# Patient Record
Sex: FEMALE | Race: WHITE | Employment: OTHER | URBAN - METROPOLITAN AREA
[De-identification: names, ages, dates, MRNs, and addresses within clinical notes are randomized per-mention and may not be internally consistent; named-entity substitution may affect disease eponyms.]

---

## 2018-05-01 PROBLEM — R73.03 PREDIABETES: Chronic | Status: ACTIVE | Noted: 2018-05-01

## 2018-05-01 PROBLEM — E03.9 ACQUIRED HYPOTHYROIDISM: Chronic | Status: ACTIVE | Noted: 2018-05-01

## 2018-05-01 PROBLEM — E78.00 HYPERCHOLESTEROLEMIA: Chronic | Status: ACTIVE | Noted: 2018-05-01

## 2018-05-01 PROBLEM — I10 HYPERTENSION: Chronic | Status: ACTIVE | Noted: 2018-05-01

## 2020-01-21 PROBLEM — Z85.71 HISTORY OF HODGKIN'S LYMPHOMA: Status: ACTIVE | Noted: 2020-01-21

## 2020-01-24 PROBLEM — M19.019 PRIMARY OSTEOARTHRITIS OF SHOULDER: Status: ACTIVE | Noted: 2020-01-24

## 2020-01-30 PROBLEM — Z85.72 HISTORY OF NON-HODGKIN'S LYMPHOMA: Status: ACTIVE | Noted: 2020-01-30

## 2020-01-31 PROBLEM — K21.9 GASTROESOPHAGEAL REFLUX DISEASE WITHOUT ESOPHAGITIS: Status: ACTIVE | Noted: 2020-01-31

## 2020-01-31 PROBLEM — D50.8 IRON DEFICIENCY ANEMIA SECONDARY TO INADEQUATE DIETARY IRON INTAKE: Status: ACTIVE | Noted: 2020-01-31

## 2020-01-31 PROBLEM — F51.01 PRIMARY INSOMNIA: Status: ACTIVE | Noted: 2020-01-31

## 2020-01-31 PROBLEM — M47.819 FACET ARTHROPATHY: Status: ACTIVE | Noted: 2020-01-31

## 2020-01-31 PROBLEM — Z87.19 S/P DILATATION OF ESOPHAGEAL STRICTURE: Status: ACTIVE | Noted: 2020-01-31

## 2020-01-31 PROBLEM — Z86.73 HISTORY OF TIA (TRANSIENT ISCHEMIC ATTACK): Status: ACTIVE | Noted: 2020-01-31

## 2020-01-31 PROBLEM — I48.91 ATRIAL FIBRILLATION (HCC): Status: ACTIVE | Noted: 2020-01-31

## 2020-01-31 PROBLEM — J61 ASBESTOSIS (HCC): Status: ACTIVE | Noted: 2020-01-31

## 2020-01-31 PROBLEM — I50.32 CHRONIC DIASTOLIC (CONGESTIVE) HEART FAILURE (HCC): Status: ACTIVE | Noted: 2020-01-31

## 2020-01-31 PROBLEM — Q28.3 CEREBRAL CAVERNOMA: Status: ACTIVE | Noted: 2020-01-31

## 2020-01-31 PROBLEM — K22.2 ESOPHAGEAL STENOSIS: Status: ACTIVE | Noted: 2020-01-31

## 2020-01-31 PROBLEM — M43.10 ANTEROLISTHESIS: Status: ACTIVE | Noted: 2020-01-31

## 2020-01-31 PROBLEM — Z95.2 HX OF AORTIC VALVE REPLACEMENT: Status: ACTIVE | Noted: 2020-01-31

## 2020-01-31 PROBLEM — R60.0 BILATERAL LOWER EXTREMITY EDEMA: Status: ACTIVE | Noted: 2020-01-31

## 2020-01-31 PROBLEM — Z79.01 CHRONIC ANTICOAGULATION: Status: ACTIVE | Noted: 2020-01-31

## 2020-01-31 PROBLEM — Z98.890 S/P DILATATION OF ESOPHAGEAL STRICTURE: Status: ACTIVE | Noted: 2020-01-31

## 2020-02-05 PROBLEM — Z95.3 HISTORY OF AORTIC VALVE REPLACEMENT WITH BIOPROSTHETIC VALVE: Status: ACTIVE | Noted: 2020-01-31

## 2020-02-05 PROBLEM — I49.5 SINUS NODE DYSFUNCTION (HCC): Status: ACTIVE | Noted: 2020-02-05

## 2020-02-10 ENCOUNTER — HOSPITAL ENCOUNTER (OUTPATIENT)
Dept: CT IMAGING | Age: 85
Discharge: HOME | End: 2020-02-10
Attending: NURSE PRACTITIONER
Payer: MEDICARE

## 2020-02-10 DIAGNOSIS — Z85.72 HISTORY OF NON-HODGKIN'S LYMPHOMA: ICD-10-CM

## 2020-02-10 DIAGNOSIS — R53.83 FATIGUE, UNSPECIFIED TYPE: ICD-10-CM

## 2020-02-10 DIAGNOSIS — R06.02 SHORTNESS OF BREATH: ICD-10-CM

## 2020-02-10 PROCEDURE — 71250 CT THORAX DX C-: CPT

## 2020-02-12 NOTE — PROGRESS NOTES
Reviewed results with patient/daughter. Will check labs tomorrow and address as needed. Answered all questions.

## 2020-02-27 PROBLEM — I06.0 RHEUMATIC AORTIC STENOSIS: Status: ACTIVE | Noted: 2020-02-27

## 2020-02-28 PROBLEM — T82.857A STENOSIS OF PROSTHETIC AORTIC VALVE: Status: ACTIVE | Noted: 2020-02-28

## 2020-03-02 ENCOUNTER — TELEPHONE (OUTPATIENT)
Dept: CASE MANAGEMENT | Age: 85
End: 2020-03-02

## 2020-03-02 DIAGNOSIS — I35.0 AORTIC VALVE STENOSIS, ETIOLOGY OF CARDIAC VALVE DISEASE UNSPECIFIED: Primary | ICD-10-CM

## 2020-03-02 NOTE — TELEPHONE ENCOUNTER
Spoke to daughter, Rose Marie Marquez, regarding pt TAVR CT on 3/4 @4522 with arrival @0573. No food after 0545 and nothing to drink except sips of water with meds after 0745. Instructed to hold demadex 3/4 am. Pt has contrast allergy causes itching, CT allergy protocol called to [facility] per pt choice. Prednisone 50 mg 13 hr prior to scan  Prednisone 50 mg 7 hr prior to scan  Prednisone 50 mg and benadryl 50 mg 1 hr prior to CT scan. Contact info provided for any questions.     Jere Ferraro, TAVR Coordinator

## 2020-03-04 ENCOUNTER — HOSPITAL ENCOUNTER (OUTPATIENT)
Dept: ULTRASOUND IMAGING | Age: 85
Discharge: HOME | End: 2020-03-04
Attending: INTERNAL MEDICINE
Payer: MEDICARE

## 2020-03-04 ENCOUNTER — HOSPITAL ENCOUNTER (OUTPATIENT)
Dept: CT IMAGING | Age: 85
Discharge: HOME | End: 2020-03-04
Attending: INTERNAL MEDICINE
Payer: MEDICARE

## 2020-03-04 VITALS — HEIGHT: 64 IN | BODY MASS INDEX: 23.9 KG/M2 | WEIGHT: 140 LBS

## 2020-03-04 DIAGNOSIS — I35.0 AORTIC VALVE STENOSIS, ETIOLOGY OF CARDIAC VALVE DISEASE UNSPECIFIED: ICD-10-CM

## 2020-03-04 PROCEDURE — 93880 EXTRACRANIAL BILAT STUDY: CPT

## 2020-03-04 PROCEDURE — 74011636320 HC RX REV CODE- 636/320: Performed by: INTERNAL MEDICINE

## 2020-03-04 PROCEDURE — 75574 CT ANGIO HRT W/3D IMAGE: CPT

## 2020-03-04 PROCEDURE — 74011000258 HC RX REV CODE- 258: Performed by: INTERNAL MEDICINE

## 2020-03-04 PROCEDURE — 71275 CT ANGIOGRAPHY CHEST: CPT

## 2020-03-04 RX ORDER — SODIUM CHLORIDE 0.9 % (FLUSH) 0.9 %
10 SYRINGE (ML) INJECTION
Status: COMPLETED | OUTPATIENT
Start: 2020-03-04 | End: 2020-03-04

## 2020-03-04 RX ADMIN — Medication 10 ML: at 09:44

## 2020-03-04 RX ADMIN — IOPAMIDOL 75 ML: 755 INJECTION, SOLUTION INTRAVENOUS at 09:44

## 2020-03-04 RX ADMIN — SODIUM CHLORIDE 100 ML: 900 INJECTION, SOLUTION INTRAVENOUS at 09:44

## 2020-03-04 NOTE — DISCHARGE INSTRUCTIONS
Patient Education        Learning About IV Extravasation  What is IV extravasation? Medicine and fluids are often given directly into a blood vessel through an IV (intravenous) tube, or catheter. Extravasation (say \"hk-jkbk-swq-SAY-edson\") is leakage of fluid in the tissues around the IV site. It happens when the catheter has come out of the blood vessel but is still in the nearby tissue. It may also happen if the blood vessel leaks because it is weak or damaged. The fluids collect in the tissues around the IV site rather than staying in the blood vessel. The buildup of fluid can cause tissue damage at the site. The leakage also prevents the medicine or fluid from being sent into the bloodstream for treatment as intended. What are the symptoms? Symptoms of IV extravasation include:  · Swelling around the IV site. · Pain. The amount of pain may depend on what type of medicine or fluid has leaked into the tissue. · Redness of the skin at the site. How is it treated? To treat IV extravasation, the nurse will remove the IV and clean the site. If needed, another IV will be inserted at a new site elsewhere on the body. The IV site will be raised above the level of the body, if it's on the arm or leg. This keeps the fluid from pooling in one place and helps prevent tissue damage. The IV site will be watched for signs of tissue damage or infection. With treatment, the swelling should go down day by day. When should you call for help? Call your doctor now or seek immediate medical care if:  · You have symptoms of infection, such as:  ? Increased pain, swelling, warmth, or redness. ? Red streaks leading from the IV site. ? Pus draining from the IV site. ? A fever. · You have new or worse pain. · The swelling around your IV site is getting worse. Watch closely for changes in your health, and be sure to contact your doctor if:  · You do not get better as expected.   Follow-up care is a key part of your treatment and safety. Be sure to make and go to all appointments, and call your doctor if you are having problems. It's also a good idea to know your test results and keep a list of the medicines you take. Where can you learn more? Go to http://lavinia-mo.info/. Enter M121 in the search box to learn more about \"Learning About IV Extravasation. \"  Current as of: December 13, 2018  Content Version: 12.2  © 6218-4351 [employer]. Care instructions adapted under license by [employer] (which disclaims liability or warranty for this information). If you have questions about a medical condition or this instruction, always ask your healthcare professional. [address] any warranty or liability for your use of this information.

## 2020-03-13 ENCOUNTER — TELEPHONE (OUTPATIENT)
Dept: CASE MANAGEMENT | Age: 85
End: 2020-03-13

## 2020-03-13 NOTE — TELEPHONE ENCOUNTER
Patient instructions given for [facility] poss with Dr Phuong Palafox. Scheduled for 3/18 at 3:30pm, arrival time 2 hr prior(1:30pm). Patient instructed to bring all home medications in labeled bottles on the day of procedure. NPO after midnight 3/18 except for sips of water with medications. Patient informed to take 81 mg x 4 on the day of procedure. Hold Demadex on am of the procedure  Hold eliquis 24-48 hr prior to the procedure- last dose on 3/16 at night. Pt has an allergy to contrast dye so therefore prednisone and benadryl have been called to the pharmacy of her choice per IV contrast protocol:  Prednisone 50 mg 13hr(0230), 7hr(0830), and 1hr(1430)  prior to procedure and benadryl 50 mg 1hr prior to procedure. Instructed they can take all other medications excluding vitamins & supplements. Patient verbalizes understanding of all instructions & denies any questions at this time. Informed that Aria Caal from cath lab will contact him with final details prior to [facility]. Contact info provided. Also instructed daughter and pt that due to precautions for COVID-19 the hospitals has implemented plans for only one visitor per pt and all patients and visitors are to come thru the outpatient entrance.      MANISH Lyman Coordinator

## 2020-03-17 NOTE — PROGRESS NOTES
Patient pre-assessment complete for [facility] poss with Dr Maura Agee scheduled for 3/18/20 at 3:30pm, arrival time 1:30pm. Patient verified using . Patient instructed to bring all home medications in labeled bottles on the day of procedure. NPO status reinforced. Patient informed to take a full dose aspirin 325mg  or 81 mg x 4 on the day of procedure. Patient instructed to HOLD eliquis (last dose 3/16/20) & HOLD torsemide in am . Instructed they can take all other medications excluding vitamins & supplements. Patient verbalizes understanding of all instructions & denies any questions at this time.

## 2020-03-18 ENCOUNTER — HOSPITAL ENCOUNTER (OUTPATIENT)
Dept: CARDIAC CATH/INVASIVE PROCEDURES | Age: 85
Discharge: HOME | End: 2020-03-18
Attending: INTERNAL MEDICINE | Admitting: INTERNAL MEDICINE
Payer: MEDICARE

## 2020-03-18 VITALS
BODY MASS INDEX: 23.39 KG/M2 | HEIGHT: 64 IN | HEART RATE: 64 BPM | WEIGHT: 137 LBS | DIASTOLIC BLOOD PRESSURE: 57 MMHG | SYSTOLIC BLOOD PRESSURE: 118 MMHG | OXYGEN SATURATION: 91 % | RESPIRATION RATE: 14 BRPM

## 2020-03-18 LAB
ANION GAP SERPL CALC-SCNC: 7 MMOL/L (ref 7–16)
ATRIAL RATE: 70 BPM
BASOPHILS # BLD: 0 K/UL (ref 0–0.2)
BASOPHILS NFR BLD: 0 % (ref 0–2)
BUN SERPL-MCNC: 25 MG/DL (ref 8–23)
CALCIUM SERPL-MCNC: 9.3 MG/DL (ref 8.3–10.4)
CALCULATED R AXIS, ECG10: -63 DEGREES
CALCULATED T AXIS, ECG11: 77 DEGREES
CHLORIDE SERPL-SCNC: 106 MMOL/L (ref 98–107)
CO2 SERPL-SCNC: 27 MMOL/L (ref 21–32)
CREAT SERPL-MCNC: 0.95 MG/DL (ref 0.6–1)
DIAGNOSIS, 93000: NORMAL
DIFFERENTIAL METHOD BLD: ABNORMAL
EOSINOPHIL # BLD: 0.2 K/UL (ref 0–0.8)
EOSINOPHIL NFR BLD: 3 % (ref 0.5–7.8)
ERYTHROCYTE [DISTWIDTH] IN BLOOD BY AUTOMATED COUNT: 14.5 % (ref 11.9–14.6)
GLUCOSE SERPL-MCNC: 175 MG/DL (ref 65–100)
HCT VFR BLD AUTO: 38.7 % (ref 35.8–46.3)
HGB BLD-MCNC: 12.5 G/DL (ref 11.7–15.4)
IMM GRANULOCYTES # BLD AUTO: 0 K/UL (ref 0–0.5)
IMM GRANULOCYTES NFR BLD AUTO: 1 % (ref 0–5)
INR PPP: 1.1
LYMPHOCYTES # BLD: 0.9 K/UL (ref 0.5–4.6)
LYMPHOCYTES NFR BLD: 14 % (ref 13–44)
MCH RBC QN AUTO: 35 PG (ref 26.1–32.9)
MCHC RBC AUTO-ENTMCNC: 32.3 G/DL (ref 31.4–35)
MCV RBC AUTO: 108.4 FL (ref 79.6–97.8)
MONOCYTES # BLD: 0.1 K/UL (ref 0.1–1.3)
MONOCYTES NFR BLD: 1 % (ref 4–12)
NEUTS SEG # BLD: 4.8 K/UL (ref 1.7–8.2)
NEUTS SEG NFR BLD: 80 % (ref 43–78)
NRBC # BLD: 0 K/UL (ref 0–0.2)
PLATELET # BLD AUTO: 169 K/UL (ref 150–450)
PMV BLD AUTO: 10.4 FL (ref 9.4–12.3)
POTASSIUM SERPL-SCNC: 4 MMOL/L (ref 3.5–5.1)
PROTHROMBIN TIME: 14.3 SEC (ref 12–14.7)
Q-T INTERVAL, ECG07: 480 MS
QRS DURATION, ECG06: 144 MS
QTC CALCULATION (BEZET), ECG08: 507 MS
RBC # BLD AUTO: 3.57 M/UL (ref 4.05–5.2)
SODIUM SERPL-SCNC: 140 MMOL/L (ref 136–145)
VENTRICULAR RATE, ECG03: 67 BPM
WBC # BLD AUTO: 5.9 K/UL (ref 4.3–11.1)

## 2020-03-18 PROCEDURE — C1769 GUIDE WIRE: HCPCS

## 2020-03-18 PROCEDURE — 74011000250 HC RX REV CODE- 250: Performed by: INTERNAL MEDICINE

## 2020-03-18 PROCEDURE — C1894 INTRO/SHEATH, NON-LASER: HCPCS

## 2020-03-18 PROCEDURE — 77030015766

## 2020-03-18 PROCEDURE — 99152 MOD SED SAME PHYS/QHP 5/>YRS: CPT

## 2020-03-18 PROCEDURE — 77030029997 HC DEV COM RDL R BND TELE -B

## 2020-03-18 PROCEDURE — 80048 BASIC METABOLIC PNL TOTAL CA: CPT

## 2020-03-18 PROCEDURE — 74011250636 HC RX REV CODE- 250/636: Performed by: INTERNAL MEDICINE

## 2020-03-18 PROCEDURE — 77030016699 HC CATH ANGI DX INFN1 CARD -A

## 2020-03-18 PROCEDURE — 74011250637 HC RX REV CODE- 250/637: Performed by: INTERNAL MEDICINE

## 2020-03-18 PROCEDURE — 74011636320 HC RX REV CODE- 636/320: Performed by: INTERNAL MEDICINE

## 2020-03-18 PROCEDURE — 85025 COMPLETE CBC W/AUTO DIFF WBC: CPT

## 2020-03-18 PROCEDURE — 93005 ELECTROCARDIOGRAM TRACING: CPT | Performed by: INTERNAL MEDICINE

## 2020-03-18 PROCEDURE — 93454 CORONARY ARTERY ANGIO S&I: CPT

## 2020-03-18 PROCEDURE — 85610 PROTHROMBIN TIME: CPT

## 2020-03-18 RX ORDER — LIDOCAINE HYDROCHLORIDE 10 MG/ML
2-20 INJECTION, SOLUTION EPIDURAL; INFILTRATION; INTRACAUDAL; PERINEURAL ONCE
Status: COMPLETED | OUTPATIENT
Start: 2020-03-18 | End: 2020-03-18

## 2020-03-18 RX ORDER — ONDANSETRON 2 MG/ML
4 INJECTION INTRAMUSCULAR; INTRAVENOUS
Status: CANCELLED | OUTPATIENT
Start: 2020-03-18 | End: 2020-03-19

## 2020-03-18 RX ORDER — SODIUM CHLORIDE 0.9 % (FLUSH) 0.9 %
5-40 SYRINGE (ML) INJECTION AS NEEDED
Status: CANCELLED | OUTPATIENT
Start: 2020-03-18

## 2020-03-18 RX ORDER — HEPARIN SODIUM 200 [USP'U]/100ML
3 INJECTION, SOLUTION INTRAVENOUS CONTINUOUS
Status: DISCONTINUED | OUTPATIENT
Start: 2020-03-18 | End: 2020-03-18 | Stop reason: HOSPADM

## 2020-03-18 RX ORDER — SODIUM CHLORIDE 0.9 % (FLUSH) 0.9 %
5-40 SYRINGE (ML) INJECTION EVERY 8 HOURS
Status: CANCELLED | OUTPATIENT
Start: 2020-03-18

## 2020-03-18 RX ORDER — SODIUM CHLORIDE 9 MG/ML
75 INJECTION, SOLUTION INTRAVENOUS CONTINUOUS
Status: CANCELLED | OUTPATIENT
Start: 2020-03-18

## 2020-03-18 RX ORDER — FENTANYL CITRATE 50 UG/ML
25-50 INJECTION, SOLUTION INTRAMUSCULAR; INTRAVENOUS
Status: DISCONTINUED | OUTPATIENT
Start: 2020-03-18 | End: 2020-03-18 | Stop reason: HOSPADM

## 2020-03-18 RX ORDER — HYDROCODONE BITARTRATE AND ACETAMINOPHEN 5; 325 MG/1; MG/1
1 TABLET ORAL
Status: CANCELLED | OUTPATIENT
Start: 2020-03-18

## 2020-03-18 RX ORDER — MIDAZOLAM HYDROCHLORIDE 1 MG/ML
.5-2 INJECTION, SOLUTION INTRAMUSCULAR; INTRAVENOUS
Status: DISCONTINUED | OUTPATIENT
Start: 2020-03-18 | End: 2020-03-18 | Stop reason: HOSPADM

## 2020-03-18 RX ORDER — GUAIFENESIN 100 MG/5ML
81-324 LIQUID (ML) ORAL ONCE
Status: COMPLETED | OUTPATIENT
Start: 2020-03-18 | End: 2020-03-18

## 2020-03-18 RX ORDER — SODIUM CHLORIDE 9 MG/ML
75 INJECTION, SOLUTION INTRAVENOUS CONTINUOUS
Status: DISCONTINUED | OUTPATIENT
Start: 2020-03-18 | End: 2020-03-18 | Stop reason: HOSPADM

## 2020-03-18 RX ORDER — ACETAMINOPHEN 325 MG/1
650 TABLET ORAL
Status: CANCELLED | OUTPATIENT
Start: 2020-03-18

## 2020-03-18 RX ADMIN — IOPAMIDOL 50 ML: 755 INJECTION, SOLUTION INTRAVENOUS at 15:01

## 2020-03-18 RX ADMIN — ASPIRIN 81 MG 324 MG: 81 TABLET ORAL at 13:50

## 2020-03-18 RX ADMIN — HEPARIN SODIUM 3 ML/HR: 5000 INJECTION, SOLUTION INTRAVENOUS; SUBCUTANEOUS at 14:52

## 2020-03-18 RX ADMIN — MIDAZOLAM 1 MG: 1 INJECTION INTRAMUSCULAR; INTRAVENOUS at 14:50

## 2020-03-18 RX ADMIN — LIDOCAINE HYDROCHLORIDE 3 ML: 10 INJECTION, SOLUTION EPIDURAL; INFILTRATION; INTRACAUDAL; PERINEURAL at 14:52

## 2020-03-18 RX ADMIN — SODIUM CHLORIDE 75 ML/HR: 900 INJECTION, SOLUTION INTRAVENOUS at 13:50

## 2020-03-18 RX ADMIN — HEPARIN SODIUM 2 ML: 10000 INJECTION, SOLUTION INTRAVENOUS; SUBCUTANEOUS at 14:52

## 2020-03-18 RX ADMIN — FENTANYL CITRATE 25 MCG: 50 INJECTION, SOLUTION INTRAMUSCULAR; INTRAVENOUS at 14:50

## 2020-03-18 NOTE — PROCEDURES
[facility]  CARDIAC CATH    Name:  Kendra Salinas  MR#:  [id number]  :  1927  ACCOUNT #:  [de-identified]  DATE OF SERVICE:  2020    PROCEDURES PERFORMED:  Coronary angiography. PREOPERATIVE DIAGNOSIS:  Severe aortic stenosis. Preoperative cardiac catheterization requested. 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 SURGEON:  Digna Rust. MD Baldev.    SURGICAL ASSISTANT:  None. ESTIMATED BLOOD LOSS:  Less than 5 mL. SPECIMENS REMOVED:  None. COMPLICATIONS:  None. IMPLANTS:  None. ANESTHESIA:  The patient received moderate supervised conscious sedation with 1 mg Versed, 25 mcg of fentanyl. Sedation start time was 02:50 p.m. with a procedure completion time of 03:04 p.m. Sedation was administered by Kimberly Nix RN under my supervision. FINDINGS:  As below. TECHNICAL FACTORS:  After informed consent was obtained, the patient was brought to the cardiac catheterization lab. The right radial artery was prepped, draped in the usual sterile fashion. Utilizing modified Seldinger technique, the right radial artery was entered. I had difficulty advancing the guidewire. Ultimately using a Choice PT wire, I was able to enter the brachial artery. At this point, 6-Faroese arterial sheath was placed without difficulty. A radial cocktail consisting of 2000 units of heparin, 2 mg verapamil, and 200 mcg of nitroglycerin was administered. A 5-Faroese Tiger 4.0 catheter was used to select and engage the ostium of left main coronary artery. A JR-4 catheter was used to select and engage the ostium of right coronary artery. Selective injection and verification was performed. A pigtail catheter was used to cross the aortic valve and the left ventricle. Hemodynamic measurements and left ventriculogram were obtained. Left ventricular aortic pressure gradient was obtained by pullback technique.     At the conclusion of the diagnostic procedure, the radial sheath was removed and a pneumatic band was placed with good hemostasis. No complications were encountered. CONTRAST ISOVUE:  50. HEMODYNAMIC RESULTS:  Aortic pressure 127/55. ANGIOGRAPHIC RESULTS:  1. Left main coronary artery:  Large-caliber vessel. 20% distal stenosis. 2.  LAD:  Medium-caliber vessel. 20% proximal and 40% mid stenosis. 3.  First diagonal artery:  Small-caliber vessel. Patent. 4.  Left circumflex:  Medium-caliber vessel. 20-30% mid stenosis. 5.  First obtuse marginal.  Medium-caliber vessel. 30% ostial stenosis. 6.  Second obtuse marginal artery:  Small-caliber vessel. Patent. 7.  Right coronary artery:  Medium caliber vessel. 20% proximal, 20% mid and 20% distal stenosis. 8.  Right PDA:  Small-caliber vessel. Patent. 9.  Right posterolateral branch:  Medium-caliber vessel. Patent. CONCLUSION:  Mild nonobstructive coronary artery disease. PLAN:  Transcatheter aortic valve replacement.       Javier Noel MD      [id number]  D:  03/18/2020 15:22  T:  03/18/2020 16:30  JOB #:  [id number]  CC:  Keyla Santizo MD

## 2020-03-18 NOTE — PROGRESS NOTES
TRANSFER - OUT REPORT:    Verbal report given to Jaya Huerta RN(name) on [address]  being transferred to CPRU(unit) for routine progression of care       Report consisted of patients Situation, Background, Assessment and   Recommendations(SBAR). Information from the following report(s) SBAR was reviewed with the receiving nurse.     [facility] w/ Dr. Nanette Fuchs  No interventions  R radial  TR band 15 mls  1 mg versed  25 mcg fentanyl

## 2020-03-18 NOTE — PROGRESS NOTES
Patient received to [address] [place] [address] room # 14  Ambulatory from [facility]. Patient scheduled for lhc/poss today with Dr Tona Edwards. Procedure reviewed & questions answered, voiced good understanding consent obtained & placed on chart. All medications and medical history reviewed. Will prep patient per orders. Patient & family updated on plan of care.       The patient has a fraility score of 3-MANAGING WELL, based on independent of adl's

## 2020-03-18 NOTE — PROGRESS NOTES
Report received from [address] [place] [address]. Procedural findings communicated. Intra procedural  medication administration reviewed. Progression of care discussed.      Patient received into [address] [facility] 6 post sheath removal.     Access site without bleeding or swelling yes    Dressing dry and intact yes    Patient instructed to limit movement to right upper extremity    Routine post procedural vital signs and site assessment initiated yes

## 2020-03-18 NOTE — DISCHARGE INSTRUCTIONS
HEART CATHETERIZATION/ANGIOGRAPHY DISCHARGE INSTRUCTIONS    1. Check puncture site frequently for swelling or bleeding. If there is any bleeding, lie down and apply pressure over the area with a clean towel or washcloth and call 911. Notify your doctor for any redness, swelling, drainage, or oozing from the puncture site. Notify your doctor for any fever or chills. 2. If the extremity becomes cold, numb, or painful call Dr. Britt Carlin at [phone].  3. Activity should be limited for the next 48 hours. Avoid pushing, pulling and bending of affected wrist for 48 hours. No heavy lifting (anything over 5 pounds) for 3 days. No driving for 48 hours. 4. You may resume your usual diet. Drink more fluids than usual.  5. Have a responsible person drive you home and stay with you for at least 24 hours after your heart catheterization/angiography. 6. You may remove bandage from your right arm  in 24 hours. You may shower in 24 hours. No tub baths, hot tubs, or swimming for 1 week. Do not place any lotions, creams, powders, or ointments over puncture site for 1 week. You may place a clean band-aid over the puncture site each day for 5 days. Change daily. I have read the above instructions and have had the opportunity to ask questions.

## 2020-03-18 NOTE — PROCEDURES
Brief Cardiac Procedure Note    Patient: Herman Martinez MRN: [id number]  SSN: [id number]    YOB: 1927  Age: 80 y.o. Sex: female      Date of Procedure: 3/18/2020     Pre-procedure Diagnosis: Aortic Stenosis    Post-procedure Diagnosis: Mild non-obstructive CAD    Procedure: Left Heart Catheterization    Brief Description of Procedure: As above    Performed By: Elva Reich MD     Assistants: None    Anesthesia: Moderate Sedation    Estimated Blood Loss: Less than 10 mL      Specimens: None    Implants: None    Findings: mild CAD. Complications: None    Recommendations: Continue medical therapy.     Signed By: Elva Reich MD     March 18, 2020

## 2020-03-18 NOTE — PROGRESS NOTES
Do you currently have any signs or symptoms of respiratory infection, such as fever, cough, shortness of breath, or sore throat? No    In the last 14 days have you had contact with someone with confirmed or presumptive diagnosis of COVID-19 or someone under investigation of COVID-19? No    In the last 14 days have you traveled or have someone in your home who has traveled to [place], [facility], [facility], [place], [place], [place], [place], or [place]?  No

## 2020-03-19 ENCOUNTER — ANESTHESIA EVENT (OUTPATIENT)
Dept: SURGERY | Age: 85
DRG: 267 | End: 2020-03-19
Payer: MEDICARE

## 2020-03-23 ENCOUNTER — HOSPITAL ENCOUNTER (OUTPATIENT)
Dept: GENERAL RADIOLOGY | Age: 85
DRG: 267 | End: 2020-03-23
Attending: PHYSICIAN ASSISTANT
Payer: MEDICARE

## 2020-03-23 ENCOUNTER — HOSPITAL ENCOUNTER (OUTPATIENT)
Dept: SURGERY | Age: 85
Discharge: HOME | DRG: 267 | End: 2020-03-23
Payer: MEDICARE

## 2020-03-23 ENCOUNTER — HOSPITAL ENCOUNTER (OUTPATIENT)
Dept: GENERAL RADIOLOGY | Age: 85
Discharge: HOME | DRG: 267 | End: 2020-03-23
Attending: PHYSICIAN ASSISTANT
Payer: MEDICARE

## 2020-03-23 ENCOUNTER — HOSPITAL ENCOUNTER (INPATIENT)
Age: 85
LOS: 2 days | Discharge: HOME | DRG: 267 | End: 2020-03-25
Attending: INTERNAL MEDICINE | Admitting: INTERNAL MEDICINE
Payer: MEDICARE

## 2020-03-23 VITALS
DIASTOLIC BLOOD PRESSURE: 88 MMHG | SYSTOLIC BLOOD PRESSURE: 196 MMHG | HEART RATE: 73 BPM | OXYGEN SATURATION: 95 % | RESPIRATION RATE: 16 BRPM | TEMPERATURE: 97.5 F | HEIGHT: 62 IN | BODY MASS INDEX: 25.47 KG/M2

## 2020-03-23 PROBLEM — N39.0 UTI (URINARY TRACT INFECTION): Status: ACTIVE | Noted: 2020-03-23

## 2020-03-23 LAB
ALBUMIN SERPL-MCNC: 3.8 G/DL (ref 3.2–4.6)
ALBUMIN/GLOB SERPL: 1.5 {RATIO} (ref 1.2–3.5)
ALP SERPL-CCNC: 60 U/L (ref 50–136)
ALT SERPL-CCNC: 22 U/L (ref 12–65)
ANION GAP SERPL CALC-SCNC: 6 MMOL/L (ref 7–16)
APPEARANCE UR: ABNORMAL
AST SERPL-CCNC: 19 U/L (ref 15–37)
ATRIAL RATE: 59 BPM
BACTERIA SPEC CULT: NORMAL
BACTERIA URNS QL MICRO: ABNORMAL /HPF
BILIRUB SERPL-MCNC: 2.1 MG/DL (ref 0.2–1.1)
BILIRUB UR QL: NEGATIVE
BNP SERPL-MCNC: 4973 PG/ML
BUN SERPL-MCNC: 16 MG/DL (ref 8–23)
CALCIUM SERPL-MCNC: 9 MG/DL (ref 8.3–10.4)
CALCULATED R AXIS, ECG10: -65 DEGREES
CALCULATED T AXIS, ECG11: 43 DEGREES
CASTS URNS QL MICRO: ABNORMAL /LPF
CHLORIDE SERPL-SCNC: 107 MMOL/L (ref 98–107)
CO2 SERPL-SCNC: 30 MMOL/L (ref 21–32)
COLOR UR: YELLOW
CREAT SERPL-MCNC: 0.8 MG/DL (ref 0.6–1)
DIAGNOSIS, 93000: NORMAL
EPI CELLS #/AREA URNS HPF: ABNORMAL /HPF
ERYTHROCYTE [DISTWIDTH] IN BLOOD BY AUTOMATED COUNT: 14.9 % (ref 11.9–14.6)
EST. AVERAGE GLUCOSE BLD GHB EST-MCNC: 120 MG/DL
GLOBULIN SER CALC-MCNC: 2.6 G/DL (ref 2.3–3.5)
GLUCOSE SERPL-MCNC: 103 MG/DL (ref 65–100)
GLUCOSE UR STRIP.AUTO-MCNC: NEGATIVE MG/DL
HBA1C MFR BLD: 5.8 % (ref 4.8–6)
HCT VFR BLD AUTO: 37.9 % (ref 35.8–46.3)
HGB BLD-MCNC: 12.5 G/DL (ref 11.7–15.4)
HGB UR QL STRIP: ABNORMAL
INR PPP: 1.2
KETONES UR QL STRIP.AUTO: NEGATIVE MG/DL
LEUKOCYTE ESTERASE UR QL STRIP.AUTO: ABNORMAL
MAGNESIUM SERPL-MCNC: 2.1 MG/DL (ref 1.8–2.4)
MCH RBC QN AUTO: 35.2 PG (ref 26.1–32.9)
MCHC RBC AUTO-ENTMCNC: 33 G/DL (ref 31.4–35)
MCV RBC AUTO: 106.8 FL (ref 79.6–97.8)
NITRITE UR QL STRIP.AUTO: POSITIVE
NRBC # BLD: 0 K/UL (ref 0–0.2)
PH UR STRIP: 7.5 [PH] (ref 5–9)
PLATELET # BLD AUTO: 178 K/UL (ref 150–450)
PMV BLD AUTO: 10.7 FL (ref 9.4–12.3)
POTASSIUM SERPL-SCNC: 3.6 MMOL/L (ref 3.5–5.1)
PROT SERPL-MCNC: 6.4 G/DL (ref 6.3–8.2)
PROT UR STRIP-MCNC: 30 MG/DL
PROTHROMBIN TIME: 15.7 SEC (ref 12–14.7)
Q-T INTERVAL, ECG07: 448 MS
QRS DURATION, ECG06: 142 MS
QTC CALCULATION (BEZET), ECG08: 454 MS
RBC # BLD AUTO: 3.55 M/UL (ref 4.05–5.2)
RBC #/AREA URNS HPF: ABNORMAL /HPF
SERVICE CMNT-IMP: NORMAL
SODIUM SERPL-SCNC: 143 MMOL/L (ref 136–145)
SP GR UR REFRACTOMETRY: 1.02 (ref 1–1.02)
UROBILINOGEN UR QL STRIP.AUTO: 4 EU/DL (ref 0.2–1)
VENTRICULAR RATE, ECG03: 62 BPM
WBC # BLD AUTO: 8.3 K/UL (ref 4.3–11.1)
WBC URNS QL MICRO: ABNORMAL /HPF

## 2020-03-23 PROCEDURE — 77030027138 HC INCENT SPIROMETER -A

## 2020-03-23 PROCEDURE — 87641 MR-STAPH DNA AMP PROBE: CPT

## 2020-03-23 PROCEDURE — 86900 BLOOD TYPING SEROLOGIC ABO: CPT

## 2020-03-23 PROCEDURE — 87186 SC STD MICRODIL/AGAR DIL: CPT

## 2020-03-23 PROCEDURE — 94010 BREATHING CAPACITY TEST: CPT

## 2020-03-23 PROCEDURE — 87088 URINE BACTERIA CULTURE: CPT

## 2020-03-23 PROCEDURE — 65270000029 HC RM PRIVATE

## 2020-03-23 PROCEDURE — 87086 URINE CULTURE/COLONY COUNT: CPT

## 2020-03-23 PROCEDURE — 83036 HEMOGLOBIN GLYCOSYLATED A1C: CPT

## 2020-03-23 PROCEDURE — 81001 URINALYSIS AUTO W/SCOPE: CPT

## 2020-03-23 PROCEDURE — 71046 X-RAY EXAM CHEST 2 VIEWS: CPT

## 2020-03-23 PROCEDURE — 83735 ASSAY OF MAGNESIUM: CPT

## 2020-03-23 PROCEDURE — 85027 COMPLETE CBC AUTOMATED: CPT

## 2020-03-23 PROCEDURE — 85610 PROTHROMBIN TIME: CPT

## 2020-03-23 PROCEDURE — 93005 ELECTROCARDIOGRAM TRACING: CPT | Performed by: PHYSICIAN ASSISTANT

## 2020-03-23 PROCEDURE — 86923 COMPATIBILITY TEST ELECTRIC: CPT

## 2020-03-23 PROCEDURE — 83880 ASSAY OF NATRIURETIC PEPTIDE: CPT

## 2020-03-23 PROCEDURE — 80053 COMPREHEN METABOLIC PANEL: CPT

## 2020-03-23 RX ORDER — DIPHENHYDRAMINE HYDROCHLORIDE 25 MG/1
25 CAPSULE ORAL SEE ADMIN INSTRUCTIONS
COMMUNITY
Start: 2020-03-19 | End: 2020-04-02

## 2020-03-23 RX ORDER — PREDNISONE 50 MG/1
50 TABLET ORAL SEE ADMIN INSTRUCTIONS
COMMUNITY
Start: 2020-03-19 | End: 2020-04-02

## 2020-03-23 NOTE — PERIOP NOTES
Patient confirms name and . Order to obtain consent not currently found in EHR, however patient agrees with case posting. Komal Mena RN TAVR navigator notified. Luis Garcia also notified that the patient mistakenly took dose of Eliquis 3/22/20 at 0830. Pt realized the mistake and has since removed Eliquis from medication box with the help of her daughter Daniela Keita. Per Luis Garcia, pt may proceed with surgery as planned. Pt verbalizes understanding of 1 total caregiver/ / visitor policy. Labs/Orders per Surgeon: completed  POC Glucose:not indocated    Dr Sarah Arellano in to assess patient per anesthesia protocol. All cardiac records reviewed. Medication list visualized today. Patient viewed preoperative heart teaching video. Pre op instructions and education sheets given and reviewed with patient:  Heart instructions, central line catheter infection prevention, ventilator education,  blood transfusion education, hand hygiene education, smoking cessation education, pain management education. Patient verbalizes understanding of all instructions. Patient given incentive spirometer with verbal instructions,demonstrates proficient use, and verbalizes understanding to bring incentive spirometer on day of surgery. Patient seen by respiratory therapist, and  FEV1 results on chart. Pt instructed on importance of handwashing for infection prevention. Pt verbalizes understanding to shower nightly with antibacterial soap & Hibiclens provided at pre assessment on the night prior to and morning of surgery. Instructed to turn water off and wash with HIBICLENS from chin to toes avoiding genitalia, then rinse after 1 minute. Pt verbalizes understanding to repeat this the morning of surgery. MRSA swab and clean catch urine obtained and sent to lab. Patient ID armband placed on patient's arm, and patient given copy of order for CXR.   Patient verbalizes understanding to proceed to radiology after labs are drawn to have both CXR and carotid ultrasound completed before leaving the hospital today. Labs drawn by Enrique Hubbard [facility] including  blood bank labs. [place] blood bank wrist band placed on the patient's right wrist and pt verbalizes understanding not to remove the band until discharged from the hospital after surgery. Patient verbalizes understanding that arrival time will be called to patient on the weekday prior to surgery date and that patient must check phone messages. If patient has any questions regarding arrival times call [phone]. PT. INSTRUCTED TO CALL THE FOLLOWING NUMBERS IF ANY SAFETY CONCERNS BEFORE, DURING, OR AFTER HOSPITAL ENCOUNTER: PT. SAFETY LINE - [phone] OR PT. RELATIONS - [phone].

## 2020-03-23 NOTE — ANESTHESIA PREPROCEDURE EVALUATION
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 2018)     Cardiovascular    Hypertension: well controlled  Valvular problems/murmurs: aortic stenosis      Dysrhythmias : atrial fibrillation  Hyperlipidemia    Exercise tolerance: <4 METS  Comments: Cath - nonobstructive dz  Echo - critical AS of prosthetic AV (mean grad 83, peak grad 140)  Severe TR, pulm HTN   GI/Hepatic/Renal  Within defined limits              Endo/Other      Hypothyroidism: well controlled  Arthritis     Other Findings              Physical Exam    Airway  Mallampati: II  TM Distance: 4 - 6 cm  Neck ROM: normal range of motion   Mouth opening: Normal     Cardiovascular    Rhythm: regular  Rate: normal    Murmur: Grade 4, Aortic area     Dental    Dentition: Full lower dentures and Full upper dentures     Pulmonary  Breath sounds clear to auscultation               Abdominal         Other Findings            Anesthetic Plan    ASA: 4  Anesthesia type: total IV anesthesia and general - backup    Monitoring Plan: Arterial line        Anesthetic plan and risks discussed with: Patient and Son / Daughter

## 2020-03-23 NOTE — PERIOP NOTES
PLEASE CONTINUE TAKING ALL PRESCRIPTION MEDICATIONS UP TO THE DAY OF SURGERY UNLESS OTHERWISE DIRECTED BELOW. DISCONTINUE all vitamins and supplements 7 days prior to surgery. DISCONTINUE Non-Steriodal Anti-Inflammatory (NSAIDS) such as Advil and Aleve 5 days prior to surgery. Home Medications to take  the day of surgery   Banophen (benadryl) and Prednisone as scheduled   Acetaminophen 1000 mg (Tylenol)    Carvedilol / Coreg  Aspirin 81 mg   levothyroxen /Synthroid     Home Medications   to Hold   Eliquis - (last dose was 3/22/20 @ 830 am)        Comments    On the day before surgery take Acetaminophen 1000mg in the morning and at bedtime      Bring to hospital: [employer] / (benadryl & prednisone)     *Visitor policy of 1 visitor per patient discussed. Please do not bring home medications with you on the day of surgery unless otherwise directed by your nurse. If you are instructed to bring home medications, please give them to your nurse as they will be administered by the nursing staff. If you have any questions, please call [facility] [phone] or [facility] [phone]. A copy of this note was provided to the patient for reference.

## 2020-03-23 NOTE — PERIOP NOTES
Recent Results (from the past 12 hour(s))   TYPE + CROSSMATCH    Collection Time: 03/23/20  8:39 AM   Result Value Ref Range    Crossmatch Expiration 03/26/2020     ABO/Rh(D) A POSITIVE     Antibody screen NEG    CBC W/O DIFF    Collection Time: 03/23/20  8:41 AM   Result Value Ref Range    WBC 8.3 4.3 - 11.1 K/uL    RBC 3.55 (L) 4.05 - 5.2 M/uL    HGB 12.5 11.7 - 15.4 g/dL    HCT 37.9 35.8 - 46.3 %    .8 (H) 79.6 - 97.8 FL    MCH 35.2 (H) 26.1 - 32.9 PG    MCHC 33.0 31.4 - 35.0 g/dL    RDW 14.9 (H) 11.9 - 14.6 %    PLATELET 807 425 - 304 K/uL    MPV 10.7 9.4 - 12.3 FL    ABSOLUTE NRBC 0.00 0.0 - 0.2 K/uL   METABOLIC PANEL, COMPREHENSIVE    Collection Time: 03/23/20  8:41 AM   Result Value Ref Range    Sodium 143 136 - 145 mmol/L    Potassium 3.6 3.5 - 5.1 mmol/L    Chloride 107 98 - 107 mmol/L    CO2 30 21 - 32 mmol/L    Anion gap 6 (L) 7 - 16 mmol/L    Glucose 103 (H) 65 - 100 mg/dL    BUN 16 8 - 23 MG/DL    Creatinine 0.80 0.6 - 1.0 MG/DL    GFR est AA >60 >60 ml/min/1.73m2    GFR est non-AA >60 >60 ml/min/1.73m2    Calcium 9.0 8.3 - 10.4 MG/DL    Bilirubin, total 2.1 (H) 0.2 - 1.1 MG/DL    ALT (SGPT) 22 12 - 65 U/L    AST (SGOT) 19 15 - 37 U/L    Alk. phosphatase 60 50 - 136 U/L    Protein, total 6.4 6.3 - 8.2 g/dL    Albumin 3.8 3.2 - 4.6 g/dL    Globulin 2.6 2.3 - 3.5 g/dL    A-G Ratio 1.5 1.2 - 3.5     PROTHROMBIN TIME + INR    Collection Time: 03/23/20  8:41 AM   Result Value Ref Range    Prothrombin time 15.7 (H) 12.0 - 14.7 sec    INR 1.2     HEMOGLOBIN A1C WITH EAG    Collection Time: 03/23/20  8:41 AM   Result Value Ref Range    Hemoglobin A1c 5.8 4.8 - 6.0 %    Est. average glucose 120 mg/dL   MAGNESIUM    Collection Time: 03/23/20  8:41 AM   Result Value Ref Range    Magnesium 2.1 1.8 - 2.4 mg/dL   MSSA/MRSA SC BY PCR, NASAL SWAB    Collection Time: 03/23/20  8:41 AM   Result Value Ref Range    Special Requests: NO SPECIAL REQUESTS      Culture result:        SA target not detected. A MRSA NEGATIVE, SA NEGATIVE test result does not preclude MRSA or SA nasal colonization. NT-PRO BNP    Collection Time: 03/23/20  8:41 AM   Result Value Ref Range    NT pro-BNP 4,973 (H) <450 PG/ML   URINALYSIS W/ RFLX MICROSCOPIC    Collection Time: 03/23/20  8:47 AM   Result Value Ref Range    Color YELLOW      Appearance CLOUDY      Specific gravity 1.017 1.001 - 1.023      pH (UA) 7.5 5.0 - 9.0      Protein 30 (A) NEG mg/dL    Glucose NEGATIVE  mg/dL    Ketone NEGATIVE  NEG mg/dL    Bilirubin NEGATIVE  NEG      Blood TRACE (A) NEG      Urobilinogen 4.0 (H) 0.2 - 1.0 EU/dL    Nitrites POSITIVE (A) NEG      Leukocyte Esterase MODERATE (A) NEG      WBC  0 /hpf    RBC 10-20 0 /hpf    Epithelial cells 5-10 0 /hpf    Bacteria 4+ (H) 0 /hpf    Casts 3-5 0 /lpf   EKG, 12 LEAD, INITIAL    Collection Time: 03/23/20  8:56 AM   Result Value Ref Range    Ventricular Rate 62 BPM    Atrial Rate 59 BPM    QRS Duration 142 ms    Q-T Interval 448 ms    QTC Calculation (Bezet) 454 ms    Calculated R Axis -65 degrees    Calculated T Axis 43 degrees    Diagnosis       Atrial fibrillation  Right bundle branch block  Left anterior fascicular block  !!! Bifascicular block !!! Moderate voltage criteria for LVH, may be normal variant  Abnormal ECG  When compared with ECG of 18-MAR-2020 13:55,  No significant change was found  Confirmed by [facility]  MD ()ANY ([id number]) on 3/23/2020 3:45:26 PM        Confirmed with Michael Perera RN TAVR Navigator that she and Grundy Warba have reviewed all labs and are aware of abnormal UA and BNP.

## 2020-03-24 ENCOUNTER — ANESTHESIA (OUTPATIENT)
Dept: SURGERY | Age: 85
DRG: 267 | End: 2020-03-24
Payer: MEDICARE

## 2020-03-24 PROBLEM — I35.0 AORTIC VALVE STENOSIS: Status: ACTIVE | Noted: 2020-03-24

## 2020-03-24 LAB
ATRIAL RATE: 59 BPM
BASE EXCESS BLD CALC-SCNC: 1 MMOL/L
BASE EXCESS BLD CALC-SCNC: 1 MMOL/L
CA-I BLD-MCNC: 1.19 MMOL/L (ref 1.12–1.32)
CA-I BLD-MCNC: 1.2 MMOL/L (ref 1.12–1.32)
CALCULATED R AXIS, ECG10: 59 DEGREES
CALCULATED T AXIS, ECG11: -75 DEGREES
DIAGNOSIS, 93000: NORMAL
GLUCOSE BLD STRIP.AUTO-MCNC: 176 MG/DL (ref 65–100)
GLUCOSE BLD STRIP.AUTO-MCNC: 185 MG/DL (ref 65–100)
HCO3 BLD-SCNC: 26.1 MMOL/L (ref 22–26)
HCO3 BLD-SCNC: 26.7 MMOL/L (ref 22–26)
PCO2 BLD: 42.6 MMHG (ref 35–45)
PCO2 BLD: 46.9 MMHG (ref 35–45)
PH BLD: 7.36 [PH] (ref 7.35–7.45)
PH BLD: 7.4 [PH] (ref 7.35–7.45)
PO2 BLD: 196 MMHG (ref 75–100)
PO2 BLD: 213 MMHG (ref 75–100)
POTASSIUM BLD-SCNC: 3.7 MMOL/L (ref 3.5–5.1)
POTASSIUM BLD-SCNC: 3.8 MMOL/L (ref 3.5–5.1)
Q-T INTERVAL, ECG07: 518 MS
QRS DURATION, ECG06: 136 MS
QTC CALCULATION (BEZET), ECG08: 486 MS
SAO2 % BLD: 100 % (ref 95–98)
SAO2 % BLD: 100 % (ref 95–98)
SODIUM BLD-SCNC: 141 MMOL/L (ref 136–145)
SODIUM BLD-SCNC: 142 MMOL/L (ref 136–145)
VENTRICULAR RATE, ECG03: 53 BPM

## 2020-03-24 PROCEDURE — 65660000000 HC RM CCU STEPDOWN

## 2020-03-24 PROCEDURE — 74011250636 HC RX REV CODE- 250/636: Performed by: NURSE ANESTHETIST, CERTIFIED REGISTERED

## 2020-03-24 PROCEDURE — 77030013438 HC CBL PCMKR REMG -B: Performed by: INTERNAL MEDICINE

## 2020-03-24 PROCEDURE — 77030020407 HC IV BLD WRMR ST 3M -A: Performed by: NURSE ANESTHETIST, CERTIFIED REGISTERED

## 2020-03-24 PROCEDURE — 77030002996 HC SUT SLK J&J -A: Performed by: INTERNAL MEDICINE

## 2020-03-24 PROCEDURE — C1894 INTRO/SHEATH, NON-LASER: HCPCS

## 2020-03-24 PROCEDURE — C1760 CLOSURE DEV, VASC: HCPCS

## 2020-03-24 PROCEDURE — 77030005318 HC CATH ELECTRD PACE TMP BARD -C

## 2020-03-24 PROCEDURE — 74011000250 HC RX REV CODE- 250: Performed by: NURSE ANESTHETIST, CERTIFIED REGISTERED

## 2020-03-24 PROCEDURE — 74011250637 HC RX REV CODE- 250/637: Performed by: INTERNAL MEDICINE

## 2020-03-24 PROCEDURE — C1769 GUIDE WIRE: HCPCS

## 2020-03-24 PROCEDURE — 77030016699 HC CATH ANGI DX INFN1 CARD -A

## 2020-03-24 PROCEDURE — 77030037468 HC VLV AORT EDWRD -L: Performed by: INTERNAL MEDICINE

## 2020-03-24 PROCEDURE — 74011250636 HC RX REV CODE- 250/636: Performed by: PHYSICIAN ASSISTANT

## 2020-03-24 PROCEDURE — 74011636320 HC RX REV CODE- 636/320: Performed by: INTERNAL MEDICINE

## 2020-03-24 PROCEDURE — 77030013120 HC ELECTRD PD DEFIB ZOLL -F: Performed by: INTERNAL MEDICINE

## 2020-03-24 PROCEDURE — 74011250636 HC RX REV CODE- 250/636: Performed by: INTERNAL MEDICINE

## 2020-03-24 PROCEDURE — 74011250637 HC RX REV CODE- 250/637: Performed by: PHYSICIAN ASSISTANT

## 2020-03-24 PROCEDURE — 74011000250 HC RX REV CODE- 250: Performed by: INTERNAL MEDICINE

## 2020-03-24 PROCEDURE — C1769 GUIDE WIRE: HCPCS | Performed by: INTERNAL MEDICINE

## 2020-03-24 PROCEDURE — 77030040922 HC BLNKT HYPOTHRM STRY -A: Performed by: NURSE ANESTHETIST, CERTIFIED REGISTERED

## 2020-03-24 PROCEDURE — 82947 ASSAY GLUCOSE BLOOD QUANT: CPT

## 2020-03-24 PROCEDURE — 77030004559 HC CATH ANGI DX SUPT CARD -B

## 2020-03-24 PROCEDURE — 76060000034 HC ANESTHESIA 1.5 TO 2 HR: Performed by: INTERNAL MEDICINE

## 2020-03-24 PROCEDURE — 74011000258 HC RX REV CODE- 258: Performed by: NURSE ANESTHETIST, CERTIFIED REGISTERED

## 2020-03-24 PROCEDURE — 76010000153 HC OR TIME 1.5 TO 2 HR: Performed by: INTERNAL MEDICINE

## 2020-03-24 PROCEDURE — 74011250636 HC RX REV CODE- 250/636: Performed by: ANESTHESIOLOGY

## 2020-03-24 PROCEDURE — 76210000006 HC OR PH I REC 0.5 TO 1 HR: Performed by: INTERNAL MEDICINE

## 2020-03-24 PROCEDURE — 77030018836 HC SOL IRR NACL ICUM -A: Performed by: INTERNAL MEDICINE

## 2020-03-24 PROCEDURE — 74011250636 HC RX REV CODE- 250/636

## 2020-03-24 PROCEDURE — 93005 ELECTROCARDIOGRAM TRACING: CPT | Performed by: INTERNAL MEDICINE

## 2020-03-24 PROCEDURE — 77030005401 HC CATH RAD ARRO -A: Performed by: NURSE ANESTHETIST, CERTIFIED REGISTERED

## 2020-03-24 PROCEDURE — 93308 TTE F-UP OR LMTD: CPT

## 2020-03-24 PROCEDURE — 77030013292 HC BOWL MX PRSM J&J -A: Performed by: NURSE ANESTHETIST, CERTIFIED REGISTERED

## 2020-03-24 PROCEDURE — 77030013794 HC KT TRNSDUC BLD EDWD -B: Performed by: NURSE ANESTHETIST, CERTIFIED REGISTERED

## 2020-03-24 PROCEDURE — 77030004558 HC CATH ANGI DX SUPR TORQ CARD -A

## 2020-03-24 PROCEDURE — 02RF38Z REPLACEMENT OF AORTIC VALVE WITH ZOOPLASTIC TISSUE, PERCUTANEOUS APPROACH: ICD-10-PCS | Performed by: INTERNAL MEDICINE

## 2020-03-24 PROCEDURE — 77030013687 HC GD NDL BARD -B

## 2020-03-24 PROCEDURE — 82803 BLOOD GASES ANY COMBINATION: CPT

## 2020-03-24 DEVICE — VALVE AORTIC SAPEIN 3 23MM -- COMMANDER SYS 9600TFX: Type: IMPLANTABLE DEVICE | Site: AORTIC VALVE | Status: FUNCTIONAL

## 2020-03-24 RX ORDER — DIPHENHYDRAMINE HYDROCHLORIDE 50 MG/ML
12.5 INJECTION, SOLUTION INTRAMUSCULAR; INTRAVENOUS
Status: DISCONTINUED | OUTPATIENT
Start: 2020-03-24 | End: 2020-03-24 | Stop reason: HOSPADM

## 2020-03-24 RX ORDER — ONDANSETRON 2 MG/ML
4 INJECTION INTRAMUSCULAR; INTRAVENOUS
Status: DISCONTINUED | OUTPATIENT
Start: 2020-03-24 | End: 2020-03-25 | Stop reason: HOSPADM

## 2020-03-24 RX ORDER — GUAIFENESIN 100 MG/5ML
81 LIQUID (ML) ORAL DAILY
Status: DISCONTINUED | OUTPATIENT
Start: 2020-03-25 | End: 2020-03-25 | Stop reason: HOSPADM

## 2020-03-24 RX ORDER — ATORVASTATIN CALCIUM 40 MG/1
40 TABLET, FILM COATED ORAL
Status: DISCONTINUED | OUTPATIENT
Start: 2020-03-24 | End: 2020-03-25 | Stop reason: HOSPADM

## 2020-03-24 RX ORDER — DIPHENHYDRAMINE HYDROCHLORIDE 50 MG/ML
INJECTION, SOLUTION INTRAMUSCULAR; INTRAVENOUS AS NEEDED
Status: DISCONTINUED | OUTPATIENT
Start: 2020-03-24 | End: 2020-03-24 | Stop reason: HOSPADM

## 2020-03-24 RX ORDER — CLOPIDOGREL BISULFATE 75 MG/1
300 TABLET ORAL ONCE
Status: COMPLETED | OUTPATIENT
Start: 2020-03-24 | End: 2020-03-24

## 2020-03-24 RX ORDER — OXYCODONE HYDROCHLORIDE 5 MG/1
5 TABLET ORAL
Status: DISCONTINUED | OUTPATIENT
Start: 2020-03-24 | End: 2020-03-24 | Stop reason: HOSPADM

## 2020-03-24 RX ORDER — FLUMAZENIL 0.1 MG/ML
0.2 INJECTION INTRAVENOUS AS NEEDED
Status: DISCONTINUED | OUTPATIENT
Start: 2020-03-24 | End: 2020-03-24 | Stop reason: HOSPADM

## 2020-03-24 RX ORDER — CEFAZOLIN SODIUM/WATER 2 G/20 ML
2 SYRINGE (ML) INTRAVENOUS ONCE
Status: COMPLETED | OUTPATIENT
Start: 2020-03-24 | End: 2020-03-24

## 2020-03-24 RX ORDER — DEXAMETHASONE SODIUM PHOSPHATE 100 MG/10ML
INJECTION INTRAMUSCULAR; INTRAVENOUS AS NEEDED
Status: DISCONTINUED | OUTPATIENT
Start: 2020-03-24 | End: 2020-03-24 | Stop reason: HOSPADM

## 2020-03-24 RX ORDER — HEPARIN SODIUM 1000 [USP'U]/ML
INJECTION, SOLUTION INTRAVENOUS; SUBCUTANEOUS AS NEEDED
Status: DISCONTINUED | OUTPATIENT
Start: 2020-03-24 | End: 2020-03-24 | Stop reason: HOSPADM

## 2020-03-24 RX ORDER — SODIUM CHLORIDE 9 MG/ML
75 INJECTION, SOLUTION INTRAVENOUS CONTINUOUS
Status: DISCONTINUED | OUTPATIENT
Start: 2020-03-24 | End: 2020-03-25 | Stop reason: HOSPADM

## 2020-03-24 RX ORDER — NITROGLYCERIN 0.4 MG/1
0.4 TABLET SUBLINGUAL
Status: DISCONTINUED | OUTPATIENT
Start: 2020-03-24 | End: 2020-03-25 | Stop reason: HOSPADM

## 2020-03-24 RX ORDER — LIDOCAINE HYDROCHLORIDE 10 MG/ML
0.1 INJECTION INFILTRATION; PERINEURAL AS NEEDED
Status: DISCONTINUED | OUTPATIENT
Start: 2020-03-24 | End: 2020-03-24 | Stop reason: HOSPADM

## 2020-03-24 RX ORDER — PROPOFOL 10 MG/ML
INJECTION, EMULSION INTRAVENOUS AS NEEDED
Status: DISCONTINUED | OUTPATIENT
Start: 2020-03-24 | End: 2020-03-24 | Stop reason: HOSPADM

## 2020-03-24 RX ORDER — LIDOCAINE HYDROCHLORIDE 10 MG/ML
INJECTION INFILTRATION; PERINEURAL AS NEEDED
Status: DISCONTINUED | OUTPATIENT
Start: 2020-03-24 | End: 2020-03-24 | Stop reason: HOSPADM

## 2020-03-24 RX ORDER — ACETAMINOPHEN 325 MG/1
650 TABLET ORAL
Status: DISCONTINUED | OUTPATIENT
Start: 2020-03-24 | End: 2020-03-24 | Stop reason: SDUPTHER

## 2020-03-24 RX ORDER — HYDROMORPHONE HYDROCHLORIDE 2 MG/ML
0.5 INJECTION, SOLUTION INTRAMUSCULAR; INTRAVENOUS; SUBCUTANEOUS
Status: DISCONTINUED | OUTPATIENT
Start: 2020-03-24 | End: 2020-03-24 | Stop reason: HOSPADM

## 2020-03-24 RX ORDER — OXYCODONE HYDROCHLORIDE 5 MG/1
10 TABLET ORAL
Status: DISCONTINUED | OUTPATIENT
Start: 2020-03-24 | End: 2020-03-24 | Stop reason: HOSPADM

## 2020-03-24 RX ORDER — NALOXONE HYDROCHLORIDE 0.4 MG/ML
0.1 INJECTION, SOLUTION INTRAMUSCULAR; INTRAVENOUS; SUBCUTANEOUS
Status: DISCONTINUED | OUTPATIENT
Start: 2020-03-24 | End: 2020-03-24 | Stop reason: HOSPADM

## 2020-03-24 RX ORDER — CLOPIDOGREL BISULFATE 75 MG/1
75 TABLET ORAL DAILY
Status: DISCONTINUED | OUTPATIENT
Start: 2020-03-25 | End: 2020-03-25

## 2020-03-24 RX ORDER — LANOLIN ALCOHOL/MO/W.PET/CERES
325 CREAM (GRAM) TOPICAL
Status: DISCONTINUED | OUTPATIENT
Start: 2020-03-25 | End: 2020-03-25 | Stop reason: HOSPADM

## 2020-03-24 RX ORDER — SODIUM CHLORIDE, SODIUM LACTATE, POTASSIUM CHLORIDE, CALCIUM CHLORIDE 600; 310; 30; 20 MG/100ML; MG/100ML; MG/100ML; MG/100ML
75 INJECTION, SOLUTION INTRAVENOUS CONTINUOUS
Status: DISCONTINUED | OUTPATIENT
Start: 2020-03-24 | End: 2020-03-24 | Stop reason: HOSPADM

## 2020-03-24 RX ORDER — SODIUM CHLORIDE, SODIUM LACTATE, POTASSIUM CHLORIDE, CALCIUM CHLORIDE 600; 310; 30; 20 MG/100ML; MG/100ML; MG/100ML; MG/100ML
25 INJECTION, SOLUTION INTRAVENOUS CONTINUOUS
Status: DISCONTINUED | OUTPATIENT
Start: 2020-03-24 | End: 2020-03-24 | Stop reason: HOSPADM

## 2020-03-24 RX ORDER — SODIUM CHLORIDE 9 MG/ML
INJECTION, SOLUTION INTRAVENOUS
Status: DISCONTINUED | OUTPATIENT
Start: 2020-03-24 | End: 2020-03-24 | Stop reason: HOSPADM

## 2020-03-24 RX ORDER — NICARDIPINE HYDROCHLORIDE 0.1 MG/ML
INJECTION INTRAVENOUS AS NEEDED
Status: DISCONTINUED | OUTPATIENT
Start: 2020-03-24 | End: 2020-03-24 | Stop reason: HOSPADM

## 2020-03-24 RX ORDER — PROTAMINE SULFATE 10 MG/ML
INJECTION, SOLUTION INTRAVENOUS AS NEEDED
Status: DISCONTINUED | OUTPATIENT
Start: 2020-03-24 | End: 2020-03-24 | Stop reason: HOSPADM

## 2020-03-24 RX ORDER — ACETAMINOPHEN 325 MG/1
650 TABLET ORAL
Status: DISCONTINUED | OUTPATIENT
Start: 2020-03-24 | End: 2020-03-25 | Stop reason: HOSPADM

## 2020-03-24 RX ORDER — SODIUM CHLORIDE 0.9 % (FLUSH) 0.9 %
5-40 SYRINGE (ML) INJECTION AS NEEDED
Status: DISCONTINUED | OUTPATIENT
Start: 2020-03-24 | End: 2020-03-25 | Stop reason: HOSPADM

## 2020-03-24 RX ORDER — SODIUM CHLORIDE 0.9 % (FLUSH) 0.9 %
5-40 SYRINGE (ML) INJECTION EVERY 8 HOURS
Status: DISCONTINUED | OUTPATIENT
Start: 2020-03-24 | End: 2020-03-25 | Stop reason: HOSPADM

## 2020-03-24 RX ORDER — NITROGLYCERIN 0.4 MG/1
0.4 TABLET SUBLINGUAL
Status: DISCONTINUED | OUTPATIENT
Start: 2020-03-24 | End: 2020-03-24 | Stop reason: SDUPTHER

## 2020-03-24 RX ORDER — CEFPODOXIME PROXETIL 200 MG/1
100 TABLET, FILM COATED ORAL EVERY 12 HOURS
Status: DISCONTINUED | OUTPATIENT
Start: 2020-03-24 | End: 2020-03-25

## 2020-03-24 RX ORDER — HEPARIN SODIUM 200 [USP'U]/100ML
INJECTION, SOLUTION INTRAVENOUS
Status: COMPLETED | OUTPATIENT
Start: 2020-03-24 | End: 2020-03-24

## 2020-03-24 RX ORDER — HYDROCODONE BITARTRATE AND ACETAMINOPHEN 5; 325 MG/1; MG/1
1 TABLET ORAL
Status: DISCONTINUED | OUTPATIENT
Start: 2020-03-24 | End: 2020-03-25 | Stop reason: HOSPADM

## 2020-03-24 RX ORDER — GUAIFENESIN 100 MG/5ML
81 LIQUID (ML) ORAL DAILY
Status: DISCONTINUED | OUTPATIENT
Start: 2020-03-25 | End: 2020-03-24 | Stop reason: SDUPTHER

## 2020-03-24 RX ORDER — PROPOFOL 10 MG/ML
INJECTION, EMULSION INTRAVENOUS
Status: DISCONTINUED | OUTPATIENT
Start: 2020-03-24 | End: 2020-03-24 | Stop reason: HOSPADM

## 2020-03-24 RX ORDER — CARVEDILOL 3.12 MG/1
3.12 TABLET ORAL 2 TIMES DAILY WITH MEALS
Status: DISCONTINUED | OUTPATIENT
Start: 2020-03-24 | End: 2020-03-25 | Stop reason: HOSPADM

## 2020-03-24 RX ORDER — HYDROCODONE BITARTRATE AND ACETAMINOPHEN 5; 325 MG/1; MG/1
1 TABLET ORAL
Status: DISCONTINUED | OUTPATIENT
Start: 2020-03-24 | End: 2020-03-24 | Stop reason: SDUPTHER

## 2020-03-24 RX ADMIN — Medication 5 ML: at 14:18

## 2020-03-24 RX ADMIN — HEPARIN SODIUM 10000 UNITS: 1000 INJECTION INTRAVENOUS; SUBCUTANEOUS at 10:14

## 2020-03-24 RX ADMIN — SODIUM CHLORIDE 75 ML/HR: 900 INJECTION, SOLUTION INTRAVENOUS at 14:16

## 2020-03-24 RX ADMIN — SODIUM CHLORIDE 0.7 MCG/KG/HR: 900 INJECTION, SOLUTION INTRAVENOUS at 09:29

## 2020-03-24 RX ADMIN — Medication 3 AMPULE: at 07:58

## 2020-03-24 RX ADMIN — CEFPODOXIME PROXETIL 100 MG: 200 TABLET, FILM COATED ORAL at 08:59

## 2020-03-24 RX ADMIN — DIPHENHYDRAMINE HYDROCHLORIDE 25 MG: 50 INJECTION, SOLUTION INTRAMUSCULAR; INTRAVENOUS at 10:05

## 2020-03-24 RX ADMIN — PHENYLEPHRINE HYDROCHLORIDE 120 MCG: 10 INJECTION INTRAVENOUS at 10:34

## 2020-03-24 RX ADMIN — Medication 1 AMPULE: at 21:25

## 2020-03-24 RX ADMIN — Medication 2 G: at 09:32

## 2020-03-24 RX ADMIN — SODIUM CHLORIDE: 900 INJECTION, SOLUTION INTRAVENOUS at 09:24

## 2020-03-24 RX ADMIN — Medication 5 ML: at 21:28

## 2020-03-24 RX ADMIN — DEXAMETHASONE SODIUM PHOSPHATE 10 MG: 10 INJECTION INTRAMUSCULAR; INTRAVENOUS at 10:05

## 2020-03-24 RX ADMIN — SODIUM CHLORIDE, SODIUM LACTATE, POTASSIUM CHLORIDE, AND CALCIUM CHLORIDE 25 ML/HR: 600; 310; 30; 20 INJECTION, SOLUTION INTRAVENOUS at 07:58

## 2020-03-24 RX ADMIN — NICARDIPINE HYDROCHLORIDE 300 MCG: 0.1 INJECTION, SOLUTION INTRAVENOUS at 10:38

## 2020-03-24 RX ADMIN — PROPOFOL 10 MG: 10 INJECTION, EMULSION INTRAVENOUS at 09:43

## 2020-03-24 RX ADMIN — SODIUM CHLORIDE 60 MCG: 900 INJECTION, SOLUTION INTRAVENOUS at 09:24

## 2020-03-24 RX ADMIN — CLOPIDOGREL BISULFATE 300 MG: 75 TABLET ORAL at 14:21

## 2020-03-24 RX ADMIN — ATORVASTATIN CALCIUM 40 MG: 40 TABLET, FILM COATED ORAL at 21:25

## 2020-03-24 RX ADMIN — PROPOFOL 25 MCG/KG/MIN: 10 INJECTION, EMULSION INTRAVENOUS at 09:43

## 2020-03-24 RX ADMIN — NICARDIPINE HYDROCHLORIDE 300 MCG: 0.1 INJECTION, SOLUTION INTRAVENOUS at 10:37

## 2020-03-24 RX ADMIN — CEFPODOXIME PROXETIL 100 MG: 200 TABLET, FILM COATED ORAL at 21:25

## 2020-03-24 RX ADMIN — PROTAMINE SULFATE 20 MG: 10 INJECTION, SOLUTION INTRAVENOUS at 10:50

## 2020-03-24 NOTE — DISCHARGE SUMMARY
[address] [place] [address] 121 Cardiology Discharge Summary     Patient ID:  Griselda Santos  [id number]  77 y.o.  4/6/1927    Admit date: 3/24/2020    Discharge date:  03/25/2020    Admitting Physician: Darby Lindquist MD     Discharge Physician: Carlee Tucker NP/Dr. Janina Wren    Admission Diagnoses: Aortic valve stenosis, etiology of cardiac valve disease unspecified [I35.0]  Aortic valve stenosis [I35.0]    Discharge Diagnoses:    Diagnosis    Aortic valve stenosis    UTI (urinary tract infection)    Stenosis of prosthetic aortic valve    Rheumatic aortic stenosis    Sinus node dysfunction (HCC)    History of aortic valve replacement with bioprosthetic valve    Gastroesophageal reflux disease without esophagitis    History of TIA (transient ischemic attack)    Atrial fibrillation (HCC)    Cerebral cavernoma    Iron deficiency anemia secondary to inadequate dietary iron intake    Chronic diastolic (congestive) heart failure (HCC)    Bilateral lower extremity edema    Chronic anticoagulation    Hypertension    Acquired hypothyroidism       Cardiology Procedures this admission:  Transcatheter aortic valve replacement  Consults: None    Hospital Course: Patient was seen at the office of [address] [place] [address] 121 Cardiology by Dr. Neva Polanco in follow up for severe aortic stenosis. She was seen by CTS and felt to be high risk for surgical AVR. The patient was felt to be an appropriate candidate for TAVR. She was admitted for planned TAVR. The patient underwent successful balloon valvuloplasty and transcatheter aortic valve replacement with a 23mm Cooley Nikolai 3 valve. The patient was monitored closely on telemetry. The morning of 03/25/2020,  patient was up feeling well without any complaints of chest pain or shortness of breath. Patient's labs were stable. Echocardiogram showed normal function of bioprosthesis.  Patient was seen and examined by Dr. Janina Wren and determined stable and ready for discharge. Patient was instructed on the importance of medication compliance including dual anti-platelet therapy for at least 6 months. The patient will have transitional care follow up with [facility] Cardiology Dr. Mariluz Pablo   A repeat echocardiogram will be obtained in 1 month. DISPOSITION: The patient is being discharged home in stable condition on a low saturated fat, low cholesterol and low salt diet. The patient is instructed to advance activities as tolerated to the limit of fatigue or shortness of breath. The patient is instructed to avoid lifting anything heavier than 10 lbs for 2 weeks. The patient is instructed to avoid any straining, stooping or squatting for 2 weeks. The patient is instructed not to drive for 1 week. The patient is instructed to watch the groin site for bleeding/oozing; if seen, the patient is instructed to apply firm pressure with a clean cloth and call [facility] Cardiology at [phone]. The patient is instructed to watch for signs of infection which include: increasing area of redness, fever/hot to touch or purulent drainage at the groin site. The patient is instructed not to soak in a bathtub for 7-10 days, but is cleared to shower. The patient is instructed to return to the ER immediately for any severe pain, color change, or temperature change in leg. The patient is informed that prophylaxis for bacterial endocarditis is recommended for high-risk procedures such as all dental procedures that involve manipulation of gingival tissue, the periapical region of teeth, or perforation of the oral mucosa.       Discharge Exam:   Visit Vitals  /66   Pulse (!) 50   Temp 97.7 °F (36.5 °C)   Resp 16   Ht 5' 1.5\" (1.562 m)   Wt 61.7 kg (136 lb)   SpO2 94%   BMI 25.28 kg/m²       Recent Results (from the past 24 hour(s))   POC CG8I    Collection Time: 03/24/20  9:43 AM   Result Value Ref Range    pH (POC) 7.396 7.35 - 7.45      pCO2 (POC) 42.6 35 - 45 MMHG    pO2 (POC) 213 (H) 75 - 100 MMHG    HCO3 (POC) 26.1 (H) 22 - 26 MMOL/L    sO2 (POC) 100 (H) 95 - 98 %    Base excess (POC) 1 mmol/L    Sodium,  136 - 145 MMOL/L    Potassium, POC 3.8 3.5 - 5.1 MMOL/L    Glucose,  (H) 65 - 100 MG/DL    Calcium, ionized (POC) 1.19 1.12 - 1.32 mmol/L   POC CG8I    Collection Time: 03/24/20 10:21 AM   Result Value Ref Range    pH (POC) 7.364 7.35 - 7.45      pCO2 (POC) 46.9 (H) 35 - 45 MMHG    pO2 (POC) 196 (H) 75 - 100 MMHG    HCO3 (POC) 26.7 (H) 22 - 26 MMOL/L    sO2 (POC) 100 (H) 95 - 98 %    Base excess (POC) 1 mmol/L    Sodium,  136 - 145 MMOL/L    Potassium, POC 3.7 3.5 - 5.1 MMOL/L    Glucose,  (H) 65 - 100 MG/DL    Calcium, ionized (POC) 1.20 1. 12 - 1.32 mmol/L   EKG, 12 LEAD, INITIAL    Collection Time: 03/24/20  1:37 PM   Result Value Ref Range    Ventricular Rate 53 BPM    Atrial Rate 59 BPM    QRS Duration 136 ms    Q-T Interval 518 ms    QTC Calculation (Bezet) 486 ms    Calculated R Axis 59 degrees    Calculated T Axis -75 degrees    Diagnosis       Atrial fibrillation with slow ventricular response  Non-specific intra-ventricular conduction block  T wave abnormality, consider inferior ischemia  Abnormal ECG  When compared with ECG of 23-MAR-2020 08:56,  Non-specific intra-ventricular conduction block has replaced (RBBB and left   anterior fascicular block)  Confirmed by [facility]  MD ()ANY ([id number]) on 3/24/2020 2:33:13 PM           Patient Instructions:     Discharge Medication List as of 3/25/2020 11:21 AM      START taking these medications    Details   cefpodoxime (VANTIN) 200 mg tablet Take 2 Tabs by mouth every twelve (12) hours for 7 days. , Print, Disp-28 Tab, R-0         CONTINUE these medications which have NOT CHANGED    Details   Banophen 25 mg capsule Take 25 mg by mouth See Admin Instructions.  3/23/20-3/24/20 -Take one tablet by mouth at 2030; 0230; 0830 (bring to preop), Historical Med, JESUS      predniSONE (DELTASONE) 50 mg tablet Take 50 mg by mouth See Admin Instructions. 3/24/20 take at 0830 (bring to preop), Historical Med      carvediloL (Coreg) 3.125 mg tablet Take 1 Tab by mouth two (2) times daily (with meals). , Normal, Disp-180 Tab, R-1      aspirin 81 mg chewable tablet Take 81 mg by mouth every morning., Historical Med      potassium chloride (KLOR-CON M10) 10 mEq tablet Take 1 Tab by mouth daily. , Normal, Disp-90 Tab, R-3      torsemide (DEMADEX) 10 mg tablet Take 1 Tab by mouth daily. , Normal, Disp-90 Tab, R-1      ferrous sulfate 325 mg (65 mg iron) tablet Take 325 mg by mouth Daily (before breakfast). , Historical Med      levothyroxine (SYNTHROID) 137 mcg tablet Take 137 mcg by mouth Daily (before breakfast). , Normal, Disp-90 Tab, R-0      apixaban (ELIQUIS) 5 mg tablet Take 2.5 mg by mouth two (2) times a day., Historical Med      atorvastatin (LIPITOR) 40 mg tablet Take 1 Tab by mouth daily. , Normal, Disp-90 Tab, R-1           Maya Suresh MD

## 2020-03-24 NOTE — PROGRESS NOTES
Operative Report    Patient: Radha Cleary MRN: [id number]  SSN: [id number]    YOB: 1927  Age: 80 y.o. Sex: female       Date of Surgery: 3/24/2020     Preoperative Diagnosis: Aortic valve stenosis, etiology of cardiac valve disease unspecified [I35.0]     Postoperative Diagnosis: Aortic valve stenosis, etiology of cardiac valve disease unspecified [I35.0]     Primary Cardiothoracic Surgeon: Jeanette Vargas MD     Primary Interventional Cardiologist: Anna Santos MD    Anesthesia: MAC     Procedure: Procedure(s):  TRANSCATHETER AORTIC VALVE REPLACEMEN JUNIOR, FRACTURE SAVR   Transcatheter aortic valve replacement (23 mm Cooley Nikolai 3 transcatheter aortic valve via left common femoral artery percutaneous approach). Findings:  Successful deployment of a 23 mm Cooley Nikolai 3 transcatheter aortic valve via left common femoral artery percutaneous approach. good LV function with no perivalvular regurgitation. The patient was in atrial fibrillation at the conclusion of the procedure. Indication for Procedure: The patient is a 80 y.o. female with symptomatic severe aortic stenosis. She was not felt to be a candidate for surgical aortic valve replacement and after discussion at the multidisciplinary valve board transcatheter aortic valve replacement was recommended. After a thorough discussion of all the risks and proposed benefits, the patient agreed to proceed. Procedure in Detail:   Patient premedicated and brought to the operating suite. Time-out performed. Standard monitoring lines placed, and the patient was intubated and placed under general anesthesia without event. Transesophageal echocardiogram was performed, confirming the above valve pathology. Intravenous cefazolin was administered. Patient prepped and draped in standard, meticulous surgical fashion.  Ioban drapes were used.     Access to the right common femoral artery and vein was obtained under ultrasound guidance, and 6-Australian sheaths were placed. A temporary transvenous right ventricular pacing wire was positioned via the femoral vein. Next, a micropuncture kit was used to gain access to the left common femoral artery and a 6-Australian sheath was placed. This was upsized and the 14-Australian Cooley transcatheter heart valve sheath was placed after deploying Perclose devices in a pre-close fashion.       . The patient remained hemodynamically stable. The 23mm Nikolai 3 valve was then prepped and mounted onto the delivery system in the correct orientation. This was then positioned in the ascending aorta, and then advanced across the aortic valve and deployed successfully Freddy. The details of this are dictated under a separate cover in Dr. Debra Pelayo report. Following deployment, an aortogram and transesophageal echocardiogram were performed, which demonstrated a no aortic regurgitation and a well-seated, well-functioning prosthetic valve in the aortic position. The patient remained hemodynamically stable and in atrial fibrillation. At this point, the delivery system and the sheath were removed from the left common femoral artery, and hemostasis was obtained using the Perclose sutures. Sheaths were also removed from the right common femoral artery and vein. At the conclusion of the procedure, the patient was hemodynamically stable and hemostasis was good. The patient was then transported to the CV ICU in critical but stable condition. At the end of the case, all sharp, sponge, and instrument counts were reported as being correct. Estimated Blood Loss:  minimal    Tourniquet Time: * No tourniquets in log *      Implants:   Implant Name Type Inv.  Item Serial No.  Lot No. LRB No. Used Action   VALVE AORTIC SAPEIN 3 23MM -- COMMANDER SYS [id number] - [id number]  VALVE AORTIC SAPEIN 3 23MM -- COMMANDER SYS [id number]  N/A 1 Implanted               Specimens: * No specimens in log *        Drains: None Complications: None    Counts: Sponge and needle counts were correct times two.     Signed By:  Jarek High MD     March 24, 2020

## 2020-03-24 NOTE — PROGRESS NOTES
Care Management Interventions  PCP Verified by CM: Yes  Last Visit to PCP: 02/24/20  Mode of Transport at Discharge: Other (see comment)(Margarita Carlin Daughter [phone] )  Transition of Care Consult (CM Consult): Discharge Planning  Discharge Durable Medical Equipment: No  Physical Therapy Consult: No  Occupational Therapy Consult: No  Speech Therapy Consult: No  Current Support Network: Lives with Caregiver, Relative's Home(Lives with daughter)  Confirm Follow Up Transport: Family  Discharge Location  Discharge Placement: Home with family assistance      Pt admitted to 3rd floor tele for AV stenosis . CM met with pt to discuss CM needs & DCP. Pt is A&Ox4. Pt is indep at home with all ADLS. Pt lives with daughter. Pt has no DME needs. Pt has no difficulty with obtaining medications or transport. DCP home with family. No further needs noted. CM to continue to monitor.

## 2020-03-24 NOTE — OP NOTES
PROCEDURE/OPERATIVE REPORT    Patient: Afia Cabrera MRN: [id number]  SSN: [id number]    YOB: 1927  Age: 80 y.o. Sex: female      DATE OF PROCEDURE: 3/24/2020     PROCEDURE: TRANSCATHETER AORTIC VALVE REPLACEMENT VIA THE LEFT FEMORAL ARTERY    INDICATION:   The patient is a 80y.o. year old with severe symptomatic aortic stenosis. After a thorough preoperative evaluation by multidisciplinary valve board, the patient was felt to be elevated risk for surgical AVR. Based on this elevated risk, it was recommended patient proceed with transcatheter aortic valve replacement. This was discussed with patient and after a thorough discussion of all the risks and proposed benefits, the patient agreed to proceed. PRIMARY INTERVENTIONAL CARDIOLOGIST: Bard Margarette MD     PRIMARY CARDIOTHORACIC SURGEON:  Sofi Hudson. Ronda Bowser MD      ASSISTING INTERVENTIONAL CARDIOLOGIST: Mariela Bright. Gaurav Mcdaniel MD        TYPE OF ANESTHESIA:  MODERATE SEDATION    Isovue: 30 Isovue    PROCEDURAL DETAILS: Patient was brought to the hybrid operating suite. Time-out performed. Standard monitoring lines placed. Patient underwent moderate sedation by Anesthesiology with continuous monitoring of hemodynamics and oxygenation. Intravenous antibiotics were administered. Patient was prepped and draped in standard, meticulous surgical fashion. Utilizing a Site-rite Ultrasound, the right common femoral artery and vein were identified. A static image was placed on the chart. Under direct ultrasound guidance, a 6-Estonian sheath was placed in the right common femoral artery and vein. At this point, a 6-Estonian LIMA catheter was advanced and utilized to selectively engage the ostium of the left common iliac artery. Angiography was performed in the AP projection with visualization of the common iliac, external iliac and femoral vessels. Anatomy was suitable for large bore arterial assess.   Under direct visualization, the left common femoral artery was entered with a micropuncture needle. This was then exchanged via a micropuncture catheter for a 6-Sierra Leonean sheath.     At this point, a transvenous balloon-tipped pacemaker was advanced via with right femoral venous sheath and placed in the RV apex under fluoroscopic guidance. Pacing was initiated and capture was ensured at 0.5 milliamps. Pacing was suspended and pacemaker was set for maximum output for the remainder of the procedure.         The 6-S[place] Leonean left common femoral sheath was then removed and the arteriotomy site was \"pre-closed\" utilizing 2 Perclose devices deployed in a 3 o'clock, 10 o'clock position. Intravenous heparin was administered and ACT was monitored with intermittent heparin administration to maintain a ACT greater than 250. After heparin was given, a 14 [place] Cooley sheath was successfully placed over a Lunderquist wire under continuous fluoroscopic guidance.      A 5 Irish Amplatz AL1 catheter was then advanced up into the aortic root via the  Cooley sheath, and a straight-tip Cordis wire was used to cross the stenotic AVR. The Amplatz catheter was placed in the Left Ventricle, and this was then exchanged for a Confida 0.35 wire with preformed distal curve.     A 23 mm Cooley Nikolai S3 valve was then prepped. This was advanced into the descending aorta. The deployment balloon was pulled back within the valve via standard fashion. The valve was then advanced around the aortic arch and across the native aortic valve under fluoroscopy guidance. The delivery sheath was pulled back in standard fashion. Angiography was performed and confirmed that the valve was in the appropriate position. At this point, the patient was paced at 180 beats a minute, with appropriate hemodynamic collapse. The valve was deployed in standard fashion. Following valve deployment, a aortogram was performed showing patent coronary arteries and trivial aortic regurgitation.  Transthoracic echocardiogram was performed that showed good position of the aortic valve prosthesis with  trivial paravalvular regurgitation. Based on the finding above, it was felt that the valve was deployed successfully.       At this point, the Cooley sheath was removed, and the Perclose sutures were deployed via standard fashion with good hemostasis. The patient was given IV Protamine and manual pressure was applied for 15 minutes.       The right common femoral artery was then imaged. The sheath was noted to be in the  common femoral artery. A Angioseal vascular closure device was deployed with good hemostasis. The pacemaker was  removed, and the venous sheath was removed. Manual pressure applied with good hemostasis.     CONCLUSION: Successful transcatheter aortic valve replacement with a 23 mm Cooley Nikolai S3 valve via a left femoral aproach.      NOTE:  Benita Valles. Javier Hansen MD was present and participated throughout the procedure.     Annmarie Edouard MD

## 2020-03-24 NOTE — PROGRESS NOTES
TRANSFER - IN REPORT:    Verbal report received from Janee Ang on [facility] being received from PACU for routine progression of care. Report consisted of patients Situation, Background, Assessment and Recommendations(SBAR). Information from the following report(s) Procedure Summary was reviewed. Opportunity for questions and clarification was provided. Assessment completed upon patients arrival to unit and care assumed. Patient received to room 329. Patient connected to monitor and assessment completed. Plan of care reviewed. Patient oriented to room and call light. Patient aware to use call light to communicate any chest pain or needs. Admission skin assessment completed with second RN and reveals the following: Patient has bilateral groin sites s/p TAVR. Red area under left eye patient stated it was from poopping a pimple. Scattered ecchymosis on BUE and BLE. Sacrum and heels are free from any breakdown.

## 2020-03-24 NOTE — ROUTINE PROCESS
Bedside and Verbal shift change report given to deborah (oncoming nurse) by self (offgoing nurse). Report included the following information SBAR, Kardex, MAR and Recent Results.   
 
Bilateral groin sites CDI

## 2020-03-24 NOTE — PROGRESS NOTES
Problem: Falls - Risk of  Goal: *Absence of Falls  Description: Document Bard  Fall Risk and appropriate interventions in the flowsheet.   Outcome: Progressing Towards Goal  Note: Fall Risk Interventions:            Medication Interventions: Assess postural VS orthostatic hypotension, Evaluate medications/consider consulting pharmacy, Patient to call before getting OOB, Teach patient to arise slowly                   Problem: Patient Education: Go to Patient Education Activity  Goal: Patient/Family Education  Outcome: Progressing Towards Goal     Problem: Patient Education: Go to Patient Education Activity  Goal: Patient/Family Education  Outcome: Progressing Towards Goal     Problem: Post-procedure,Day of TAVR  Goal: *Stable Cardiac Rhythm  Outcome: Progressing Towards Goal  Goal: *Hemodynamically stable  Outcome: Progressing Towards Goal  Goal: *Optimal pain control at patient's stated goal  Outcome: Progressing Towards Goal  Goal: *Lungs clear or at baseline  Outcome: Progressing Towards Goal  Goal: *Demonstrates progressive activity  Outcome: Progressing Towards Goal  Goal: *Procedure site is without bleeding and signs of infection six hours post sheath removal  Outcome: Progressing Towards Goal  Goal: *Pulses palpable, skin color within defined limits, skin temperature warm  Outcome: Progressing Towards Goal  Goal: *Bilateral lower extremities neurovascularly intact  Outcome: Progressing Towards Goal  Goal: Activity/Safety  Outcome: Progressing Towards Goal  Goal: Consults  Outcome: Progressing Towards Goal  Goal: Diagnostic Tests/Procedures  Outcome: Progressing Towards Goal  Goal: Nutrition/Diet  Outcome: Progressing Towards Goal  Goal: Discharge Planning  Outcome: Progressing Towards Goal  Goal: Medications  Outcome: Progressing Towards Goal  Goal: Respiratory  Outcome: Progressing Towards Goal  Goal: Treatments/Interventions/Procedures  Outcome: Progressing Towards Goal  Goal: Psychosocial Needs  Outcome: Progressing Towards Goal     Problem: Pre-procedure, TAVR  Goal: *Verbalizes Description of Procedure  Outcome: Resolved/Met  Goal: *Hemodynamically stable  Outcome: Resolved/Met  Goal: *Verbalizes Description of Procedure  Outcome: Resolved/Met  Goal: Activity/Safety  Outcome: Resolved/Met  Goal: Consults  Outcome: Resolved/Met  Goal: Diagnostic Tests/Procedures  Outcome: Resolved/Met  Goal: Nutrition/Diet  Outcome: Resolved/Met  Goal: Medications  Outcome: Resolved/Met  Goal: Respiratory  Outcome: Resolved/Met  Goal: Treatments/Interventions/Procedures  Outcome: Resolved/Met  Goal: Psychosocial Needs  Outcome: Resolved/Met

## 2020-03-24 NOTE — ANESTHESIA POSTPROCEDURE EVALUATION
Procedure(s):  TRANSCATHETER AORTIC VALVE REPLACEMEN JUNIOR, FRACTURE SAVR. total IV anesthesia, general - backup    Anesthesia Post Evaluation      Multimodal analgesia: multimodal analgesia used between 6 hours prior to anesthesia start to PACU discharge  Patient location during evaluation: PACU  Patient participation: complete - patient participated  Level of consciousness: awake  Pain management: adequate  Airway patency: patent  Anesthetic complications: no  Cardiovascular status: acceptable and hemodynamically stable  Respiratory status: acceptable  Hydration status: acceptable  Comments: Acceptable for discharge from PACU. Post anesthesia nausea and vomiting:  none      Vitals Value Taken Time   /61 3/24/2020 11:40 AM   Temp 36.1 °C (96.9 °F) 3/24/2020 11:10 AM   Pulse 54 3/24/2020 11:42 AM   Resp 16 3/24/2020 11:10 AM   SpO2 97 % 3/24/2020 11:42 AM   Vitals shown include unvalidated device data.

## 2020-03-24 NOTE — PROGRESS NOTES
Patient's HR is sustaining between 40-50 asymptomatic. Coreg 3.12mg is due now. Coleen Nova NP notified orders to hold medication.

## 2020-03-24 NOTE — ROUTINE PROCESS
Bedside and Verbal shift change report given to self, RN (oncoming nurse) by Kristina Rehman RN (offgoing nurse). Report included the following information SBAR, Kardex, Intake/Output, MAR, Recent Results and Cardiac Rhythm A-fib. Bilateral groin sites visualized with off going RN. Dressings CDI.

## 2020-03-24 NOTE — PROGRESS NOTES
Patient completed bedrest for 4 hours and is now resting in the chair. Bilateral groin sites are CDI. VSS. Patient reporting no pain     1730: Patient walked a couple laps around the floor. No signs of complaint or distress noted.

## 2020-03-24 NOTE — PERIOP NOTES
TRANSFER - OUT REPORT:    Verbal report given to Tawnya Funk on [facility] 76  being transferred to [phone] for routine post - op       Report consisted of patients Situation, Background, Assessment and   Recommendations(SBAR). Information from the following report(s) OR Summary, Procedure Summary, Intake/Output and MAR was reviewed with the receiving nurse. Lines:   Peripheral IV 03/24/20 Left Hand (Active)   Site Assessment Clean, dry, & intact 3/24/2020 12:00 PM   Phlebitis Assessment 0 3/24/2020 12:00 PM   Infiltration Assessment 0 3/24/2020 12:00 PM   Dressing Status Clean, dry, & intact 3/24/2020 12:00 PM   Dressing Type Tape;Transparent 3/24/2020 12:00 PM   Hub Color/Line Status Patent;Green 3/24/2020 12:00 PM   Alcohol Cap Used No 3/24/2020 12:00 PM       Peripheral IV 03/24/20 Right Hand (Active)   Site Assessment Clean, dry, & intact 3/24/2020 12:00 PM   Phlebitis Assessment 0 3/24/2020 12:00 PM   Infiltration Assessment 0 3/24/2020 12:00 PM   Dressing Status Clean, dry, & intact 3/24/2020 12:00 PM   Dressing Type Tape;Transparent 3/24/2020 12:00 PM   Hub Color/Line Status Patent 3/24/2020 12:00 PM   Alcohol Cap Used No 3/24/2020 12:00 PM        Opportunity for questions and clarification was provided. Patient transported with:   Monitor  O2 @ 6 liters    VTE prophylaxis orders have not been written for [place] [id number]. Patient and family given floor number and nurses name. Family updated re: pt status after security code verified.

## 2020-03-24 NOTE — ANESTHESIA PROCEDURE NOTES
Arterial Line Placement    Start time: 3/24/2020 9:28 AM  End time: 3/24/2020 9:31 AM  Performed by: Ruby Bauer CRNA  Authorized by: Nakita Sneed MD     Pre-Procedure  Indications:  Arterial pressure monitoring and blood sampling  Preanesthetic Checklist: patient identified, risks and benefits discussed, anesthesia consent, site marked, patient being monitored, timeout performed and patient being monitored    Timeout Time: 09:28        Procedure:   Prep:  Chlorhexidine  Seldinger Technique?: Yes    Orientation:  Left  Location:  Radial artery  Number of attempts:  1  Cont Cardiac Output Sensor: No      Assessment:   Post-procedure:  Line secured and sterile dressing applied  Patient Tolerance:  Patient tolerated the procedure well with no immediate complications

## 2020-03-24 NOTE — PROGRESS NOTES
Initial visit was made,  Also a prayer, emotional support and a spiritual presence were provided. She was with medical staff.     Tod Goodell, MDIV

## 2020-03-25 VITALS
HEIGHT: 62 IN | TEMPERATURE: 98 F | BODY MASS INDEX: 25.89 KG/M2 | SYSTOLIC BLOOD PRESSURE: 158 MMHG | WEIGHT: 140.7 LBS | HEART RATE: 80 BPM | DIASTOLIC BLOOD PRESSURE: 60 MMHG | OXYGEN SATURATION: 98 % | RESPIRATION RATE: 17 BRPM

## 2020-03-25 PROBLEM — Z95.2 S/P TAVR (TRANSCATHETER AORTIC VALVE REPLACEMENT): Chronic | Status: ACTIVE | Noted: 2020-03-24

## 2020-03-25 LAB
ANION GAP SERPL CALC-SCNC: 6 MMOL/L (ref 7–16)
ATRIAL RATE: 59 BPM
BACTERIA SPEC CULT: ABNORMAL
BASOPHILS # BLD: 0 K/UL (ref 0–0.2)
BASOPHILS NFR BLD: 0 % (ref 0–2)
BUN SERPL-MCNC: 22 MG/DL (ref 8–23)
CALCIUM SERPL-MCNC: 8.6 MG/DL (ref 8.3–10.4)
CALCULATED R AXIS, ECG10: 57 DEGREES
CALCULATED T AXIS, ECG11: -82 DEGREES
CHLORIDE SERPL-SCNC: 109 MMOL/L (ref 98–107)
CO2 SERPL-SCNC: 29 MMOL/L (ref 21–32)
CREAT SERPL-MCNC: 0.77 MG/DL (ref 0.6–1)
DIAGNOSIS, 93000: NORMAL
DIFFERENTIAL METHOD BLD: ABNORMAL
EOSINOPHIL # BLD: 0 K/UL (ref 0–0.8)
EOSINOPHIL NFR BLD: 0 % (ref 0.5–7.8)
ERYTHROCYTE [DISTWIDTH] IN BLOOD BY AUTOMATED COUNT: 15.1 % (ref 11.9–14.6)
GLUCOSE SERPL-MCNC: 164 MG/DL (ref 65–100)
HCT VFR BLD AUTO: 32.5 % (ref 35.8–46.3)
HGB BLD-MCNC: 10.4 G/DL (ref 11.7–15.4)
IMM GRANULOCYTES # BLD AUTO: 0.1 K/UL (ref 0–0.5)
IMM GRANULOCYTES NFR BLD AUTO: 1 % (ref 0–5)
LYMPHOCYTES # BLD: 0.6 K/UL (ref 0.5–4.6)
LYMPHOCYTES NFR BLD: 5 % (ref 13–44)
MAGNESIUM SERPL-MCNC: 1.9 MG/DL (ref 1.8–2.4)
MCH RBC QN AUTO: 34.6 PG (ref 26.1–32.9)
MCHC RBC AUTO-ENTMCNC: 32 G/DL (ref 31.4–35)
MCV RBC AUTO: 108 FL (ref 79.6–97.8)
MONOCYTES # BLD: 1 K/UL (ref 0.1–1.3)
MONOCYTES NFR BLD: 8 % (ref 4–12)
NEUTS SEG # BLD: 11 K/UL (ref 1.7–8.2)
NEUTS SEG NFR BLD: 86 % (ref 43–78)
NRBC # BLD: 0 K/UL (ref 0–0.2)
PLATELET # BLD AUTO: 133 K/UL (ref 150–450)
PMV BLD AUTO: 10.6 FL (ref 9.4–12.3)
POTASSIUM SERPL-SCNC: 3.9 MMOL/L (ref 3.5–5.1)
Q-T INTERVAL, ECG07: 490 MS
QRS DURATION, ECG06: 142 MS
QTC CALCULATION (BEZET), ECG08: 493 MS
RBC # BLD AUTO: 3.01 M/UL (ref 4.05–5.2)
SERVICE CMNT-IMP: ABNORMAL
SODIUM SERPL-SCNC: 144 MMOL/L (ref 136–145)
VENTRICULAR RATE, ECG03: 61 BPM
WBC # BLD AUTO: 12.7 K/UL (ref 4.3–11.1)

## 2020-03-25 PROCEDURE — 93306 TTE W/DOPPLER COMPLETE: CPT

## 2020-03-25 PROCEDURE — 74011250637 HC RX REV CODE- 250/637: Performed by: PHYSICIAN ASSISTANT

## 2020-03-25 PROCEDURE — 74011250636 HC RX REV CODE- 250/636: Performed by: NURSE PRACTITIONER

## 2020-03-25 PROCEDURE — 74011250637 HC RX REV CODE- 250/637: Performed by: INTERNAL MEDICINE

## 2020-03-25 PROCEDURE — 83735 ASSAY OF MAGNESIUM: CPT

## 2020-03-25 PROCEDURE — 36415 COLL VENOUS BLD VENIPUNCTURE: CPT

## 2020-03-25 PROCEDURE — 85025 COMPLETE CBC W/AUTO DIFF WBC: CPT

## 2020-03-25 PROCEDURE — 80048 BASIC METABOLIC PNL TOTAL CA: CPT

## 2020-03-25 PROCEDURE — 74011000258 HC RX REV CODE- 258: Performed by: NURSE PRACTITIONER

## 2020-03-25 PROCEDURE — 93005 ELECTROCARDIOGRAM TRACING: CPT | Performed by: INTERNAL MEDICINE

## 2020-03-25 RX ORDER — CEFPODOXIME PROXETIL 200 MG/1
400 TABLET, FILM COATED ORAL EVERY 12 HOURS
Status: DISCONTINUED | OUTPATIENT
Start: 2020-03-25 | End: 2020-03-25 | Stop reason: HOSPADM

## 2020-03-25 RX ORDER — CEFPODOXIME PROXETIL 200 MG/1
400 TABLET, FILM COATED ORAL EVERY 12 HOURS
Qty: 28 TAB | Refills: 0 | Status: SHIPPED | OUTPATIENT
Start: 2020-03-25 | End: 2020-04-01

## 2020-03-25 RX ADMIN — Medication 5 ML: at 06:22

## 2020-03-25 RX ADMIN — CEFTRIAXONE 1 G: 1 INJECTION, POWDER, FOR SOLUTION INTRAMUSCULAR; INTRAVENOUS at 09:15

## 2020-03-25 RX ADMIN — CLOPIDOGREL BISULFATE 75 MG: 75 TABLET ORAL at 08:17

## 2020-03-25 RX ADMIN — Medication 1 AMPULE: at 08:16

## 2020-03-25 RX ADMIN — LEVOTHYROXINE SODIUM 137 MCG: 0.11 TABLET ORAL at 08:17

## 2020-03-25 RX ADMIN — FERROUS SULFATE TAB 325 MG (65 MG ELEMENTAL FE) 325 MG: 325 (65 FE) TAB at 08:17

## 2020-03-25 RX ADMIN — CARVEDILOL 3.12 MG: 3.12 TABLET, FILM COATED ORAL at 08:17

## 2020-03-25 RX ADMIN — CEFPODOXIME PROXETIL 100 MG: 200 TABLET, FILM COATED ORAL at 08:17

## 2020-03-25 RX ADMIN — ASPIRIN 81 MG 81 MG: 81 TABLET ORAL at 08:17

## 2020-03-25 NOTE — ROUTINE PROCESS
Verbal bedside report received from Ria Rodríguez, [place]. Assumed care of patient.

## 2020-03-25 NOTE — PROGRESS NOTES
Problem: Falls - Risk of  Goal: *Absence of Falls  Description: Document Clydene Bone Fall Risk and appropriate interventions in the flowsheet.   Outcome: Progressing Towards Goal  Note: Fall Risk Interventions:            Medication Interventions: Evaluate medications/consider consulting pharmacy, Patient to call before getting OOB, Teach patient to arise slowly                   Problem: Post-procedure Day 1,TAVR  Goal: *Stable Cardiac Rhythm  Outcome: Progressing Towards Goal     Problem: Pain  Goal: *Control of Pain  Outcome: Progressing Towards Goal

## 2020-03-25 NOTE — PROCEDURES
[facility]  PROCEDURE NOTE    Name:  Vinnie Coppola  MR#:  [id number]  :  1927  ACCOUNT #:  [de-identified]  DATE OF SERVICE:  2020    PROCEDURE PERFORMED:  Baseline spirometry. FINDINGS:  Spirometry demonstrates a restrictive pattern. IMPRESSION:  Restrictive pattern. Given advanced age predictive values were not available, but I suspect that this is at least moderately restrictive. We will need complete pulmonary function tests for further evaluation if clinically indicated.     Bekah Chandra MD    [id number]  D:  2020 17:33  T:  2020 19:17  JOB #:  [id number]

## 2020-03-25 NOTE — DISCHARGE INSTRUCTIONS
HOME INSTRUCTION FOLLOWING TRANSCATHETER AORTIC VALVE REPLACEMENT (TAVR)    What is my main problem? You have just had your diseased aortic valve replaced with an artificial valve. Below is what you need to do when you go home. What do I need to do? ACTIVITY:  · Weigh yourself every day in the morning after using the bathroom. · Get up and get dressed every day. Do not stay in bed. · Set a daily routine. · When you are tired, rest.  · NO DRIVING For 1 WEEK. You may ride in a car. · Please let your doctor know if you need to leave town before your first follow-up visit. · Do NOT lift more than five to ten pounds, No Straining, Stooping, or Squatting for two weeks. · You may walk up and down a few steps. · Walk as much as you can. ·   Cough and deep breathe, and use your incentive spirometer 10 times each hour when you are awake. DIET:  · We recommend the Cardiac diet. Your nurse can provide a printed handout to you on this diet. INCISION CARE:    · Shower every day. No tub baths for the first week you are at home. · Cleanse wounds with mild soap and water. Keep wounds dry. · A small amount of bloody or clear drainage is normal.  · Watch for signs of infection: redness, incision hot to the touch, fever greater than 101 degrees, swelling at the groin incision site, or discolored drainage from your incision. MEDICATIONS:    · DO NOT STOP TAKING YOUR MEDICINES WITHOUT SPEAKING WITH  YOUR CARDIOLOGIST! · Take your pills as ordered at the time of discharge. · Do  not stop taking any medicine without first discussing it with your doctor. · Avoid all over the counter medications, herbal, and natural supplements unless you have discussed them with your doctor. · It is important to follow your doctors orders, especially if blood thinning drugs are prescribed.    Your doctor will monitor your medicine and advise you when or if you can stop taking       Why is it important for me to do this?    ·  Regular check-ups by your cardiologist are very important. It is easier for patients with a heart   valve replacement to get infections, which could lead to future heart damage. ·  Before any dental work or surgery is done, tell your dentist or surgeon about your heart valve replacement. You may need to take antibiotics before and after some medical procedures to reduce the risk of infection. ·  Always tell the doctor (or medical technician) that you have an implanted heart Valve. What can I expect? HEALTHY HABITS: To maintain a healthy physical condition, we have the following suggestions:  · No smoking!!! If you need help to stop smoking, please contact your doctor. · Attempt to achieve and maintain your ideal body weight. ·    Walking is great exercise for the body and the mind! We suggest that you walk as much as you can    When to call the doctor or speak to the nurse:    · Weight gain of 3 pounds in one day or 5 pounds in one week. Weight gain is NOT normal.  · Swelling of the legs, ankles, or feet  · Increasing shortness of breath  · Change in the color or temperature of your lower legs and feet. · Develop abdominal pain or unrelieved nausea and/or vomiting. · Develop any numbness, tingling, or limited movement of your arms or legs. · Signs of infection: redness, incision hot to the touch, fevers greater than 101 degrees, or colored drainage from your incision. · Seroma is an accumulation of fluid in the tissues at the groin surgical site. Symptoms may include: a lump near the groin surgical site, clear fluid draining from the site, redness, warmth, or swelling. ADDITIONAL INFORMATION:  · Your follow-up echocardiogram has been scheduled along with your 30 day TAVR follow up visit. This visit is very important for you, so be sure to attend this visit. We are here for you! If you have any questions, please feel free to call your physician.  His office number is:     [facility]  [phone]                   Valve Coordinator- Pam Solis - [phone]      Preventing the Spread of Coronavirus Disease 2019 in Homes and Residential Communities   For the most recent information go to [employer].    Prevention steps for People with confirmed or suspected COVID-19 (including persons under investigation) who do not need to be hospitalized  and   People with confirmed COVID-19 who were hospitalized and determined to be medically stable to go home    Your healthcare provider and public health staff will evaluate whether you can be cared for at home. If it is determined that you do not need to be hospitalized and can be isolated at home, you will be monitored by staff from your local or state health department. You should follow the prevention steps below until a healthcare provider or local or state health department says you can return to your normal activities. Stay home except to get medical care  People who are mildly ill with COVID-19 are able to isolate at home during their illness. You should restrict activities outside your home, except for getting medical care. Do not go to work, school, or public areas. Avoid using public transportation, ride-sharing, or taxis. Separate yourself from other people and animals in your home  People: As much as possible, you should stay in a specific room and away from other people in your home. Also, you should use a separate bathroom, if available. Animals: You should restrict contact with pets and other animals while you are sick with COVID-19, just like you would around other people.  Although there have not been reports of pets or other animals becoming sick with COVID-19, it is still recommended that people sick with COVID-19 limit contact with animals until more information is known about the virus. When possible, have another member of your household care for your animals while you are sick. If you are sick with COVID-19, avoid contact with your pet, including petting, snuggling, being kissed or licked, and sharing food. If you must care for your pet or be around animals while you are sick, wash your hands before and after you interact with pets and wear a facemask. Call ahead before visiting your doctor  If you have a medical appointment, call the healthcare provider and tell them that you have or may have COVID-19. This will help the healthcare providers office take steps to keep other people from getting infected or exposed. Wear a facemask  You should wear a facemask when you are around other people (e.g., sharing a room or vehicle) or pets and before you enter a healthcare providers office. If you are not able to wear a facemask (for example, because it causes trouble breathing), then people who live with you should not stay in the same room with you, or they should wear a facemask if they enter your room. Cover your coughs and sneezes  Cover your mouth and nose with a tissue when you cough or sneeze. Throw used tissues in a lined trash can. Immediately wash your hands with soap and water for at least 20 seconds or, if soap and water are not available, clean your hands with an alcohol-based hand  that contains at least 60% alcohol. Clean your hands often  Wash your hands often with soap and water for at least 20 seconds, especially after blowing your nose, coughing, or sneezing; going to the bathroom; and before eating or preparing food. If soap and water are not readily available, use an alcohol-based hand  with at least 60% alcohol, covering all surfaces of your hands and rubbing them together until they feel dry. Soap and water are the best option if hands are visibly dirty.  Avoid touching your eyes, nose, and mouth with unwashed hands. Avoid sharing personal household items  You should not share dishes, drinking glasses, cups, eating utensils, towels, or bedding with other people or pets in your home. After using these items, they should be washed thoroughly with soap and water. Clean all high-touch surfaces everyday  High touch surfaces include counters, tabletops, doorknobs, bathroom fixtures, toilets, phones, keyboards, tablets, and bedside tables. Also, clean any surfaces that may have blood, stool, or body fluids on them. Use a household cleaning spray or wipe, according to the label instructions. Labels contain instructions for safe and effective use of the cleaning product including precautions you should take when applying the product, such as wearing gloves and making sure you have good ventilation during use of the product. Monitor your symptoms  Seek prompt medical attention if your illness is worsening (e.g., difficulty breathing). Before seeking care, call your healthcare provider and tell them that you have, or are being evaluated for, COVID-19. Put on a facemask before you enter the facility. These steps will help the healthcare providers office to keep other people in the office or waiting room from getting infected or exposed. Ask your healthcare provider to call the local or state health department. Persons who are placed under active monitoring or facilitated self-monitoring should follow instructions provided by their local health department or occupational health professionals, as appropriate. When working with your local health department check their available hours. If you have a medical emergency and need to call 911, notify the dispatch personnel that you have, or are being evaluated for COVID-19. If possible, put on a facemask before emergency medical services arrive.   Discontinuing home isolation  Patients with confirmed COVID-19 should remain under home isolation precautions until the risk of secondary transmission to others is thought to be low. The decision to discontinue home isolation precautions should be made on a case-by-case basis, in consultation with healthcare providers and state and local health departments. Cefpodoxime Proxetil (Vantin) - (By mouth)   Why this medicine is used:   Treats bacterial infections. Contact a nurse or doctor right away if you have:  · Blistering or peeling skin  · Severe diarrhea     Common side effects:  · Vaginal itching or discharge  · Nausea  © 2017 [address] is for End User's use only and may not be sold, redistributed or otherwise used for commercial purposes. DISCHARGE SUMMARY from Nurse    PATIENT INSTRUCTIONS:    After general anesthesia or intravenous sedation, for 24 hours or while taking prescription Narcotics:  · Limit your activities  · Do not drive and operate hazardous machinery  · Do not make important personal or business decisions  · Do  not drink alcoholic beverages  · If you have not urinated within 8 hours after discharge, please contact your surgeon on call. Report the following to your surgeon:  · Excessive pain, swelling, redness or odor of or around the surgical area  · Temperature over 100.5  · Nausea and vomiting lasting longer than 4 hours or if unable to take medications  · Any signs of decreased circulation or nerve impairment to extremity: change in color, persistent  numbness, tingling, coldness or increase pain  · Any questions    What to do at Home:  Recommended activity: Activity as tolerated and no driving for today 3-5 days. If you experience any of the following symptoms chest pain, shortness of breath please follow up with [address] [facility] [address] [facility]. *  Please give a list of your current medications to your Primary Care Provider.     *  Please update this list whenever your medications are discontinued, doses are      changed, or new medications (including over-the-counter products) are added. *  Please carry medication information at all times in case of emergency situations. These are general instructions for a healthy lifestyle:    No smoking/ No tobacco products/ Avoid exposure to second hand smoke  Surgeon General's Warning:  Quitting smoking now greatly reduces serious risk to your health. Obesity, smoking, and sedentary lifestyle greatly increases your risk for illness    A healthy diet, regular physical exercise & weight monitoring are important for maintaining a healthy lifestyle    You may be retaining fluid if you have a history of heart failure or if you experience any of the following symptoms:  Weight gain of 3 pounds or more overnight or 5 pounds in a week, increased swelling in our hands or feet or shortness of breath while lying flat in bed. Please call your doctor as soon as you notice any of these symptoms; do not wait until your next office visit. The discharge information has been reviewed with the patient. The patient verbalized understanding. Discharge medications reviewed with the patient and appropriate educational materials and side effects teaching were provided.   ___________________________________________________________________________________________________________________________________

## 2020-03-25 NOTE — PROGRESS NOTES
Care Management Interventions  PCP Verified by CM: Yes  Last Visit to PCP: 02/24/20  Mode of Transport at Discharge:  Other (see comment)(Margarita Carlin Daughter [phone] )  Transition of Care Consult (CM Consult): Discharge Planning  Discharge Durable Medical Equipment: No  Physical Therapy Consult: No  Occupational Therapy Consult: No  Speech Therapy Consult: No  Current Support Network: Lives with Caregiver, Relative's Home(Lives with daughter)  Confirm Follow Up Transport: Family  Discharge Location  Discharge Placement: Home with family assistance

## 2020-03-25 NOTE — ROUTINE PROCESS
Bedside and Verbal shift change report given to Naya Aguayo RN (oncoming nurse) by self, RN (offgoing nurse). Report included the following information SBAR, Kardex, Intake/Output, MAR, Recent Results and Cardiac Rhythm A-fib.

## 2020-03-25 NOTE — ROUTINE PROCESS
Discharge instructions reviewed with patient. Prescriptions given for Vantin and med info sheets provided for all new medications. Opportunity for questions provided. Patient voiced understanding of all discharge instructions. Patient's IV and telemetry monitor removed from patient.

## 2020-03-25 NOTE — PROGRESS NOTES
[facility] CARDIOLOGY PROGRESS NOTE           3/25/2020 11:22 AM    Admit Date: 3/24/2020      Subjective:   Patient is stable post TAVR. Groin sites are stable. Echo is stable with mean gradient 15-17mmHg. ROS:  Cardiovascular:  As noted above    Objective:      Vitals:    03/24/20 2020 03/25/20 0040 03/25/20 0542 03/25/20 0817   BP: 154/55 144/56 168/67 158/60   Pulse: 63 66 69 80   Resp: 17 17 17 17   Temp: 98.3 °F (36.8 °C) 98.2 °F (36.8 °C) 98.7 °F (37.1 °C) 98 °F (36.7 °C)   SpO2: 94% 92% 91% 98%   Weight:   63.8 kg (140 lb 11.2 oz)    Height:           Physical Exam:  General-No Acute Distress  Neck- supple, no JVD  CV- regular rate and rhythm iii/vi at LLSB  Lung- clear bilaterally  Abd- soft, nontender, nondistended  Ext- no edema bilaterally. 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             Lety Martin MD  3/25/2020 11:22 AM

## 2020-03-25 NOTE — PROGRESS NOTES
Spoke with patient's daughter, Erica Coy. Update was given on patient after 4 digit security code provided. Opportunity for questions provided.

## 2020-03-26 ENCOUNTER — PATIENT OUTREACH (OUTPATIENT)
Dept: CASE MANAGEMENT | Age: 85
End: 2020-03-26

## 2020-03-26 NOTE — PROGRESS NOTES
CTN attempted to reach out to contact for [facility] call. Patient daughter is contact. No answer, VM left. [address] cardiology RN has also reached out without success for EDWARD. CTN to attempt again at a later time.

## 2020-03-27 LAB
ABO + RH BLD: NORMAL
BLD PROD TYP BPU: NORMAL
BLOOD GROUP ANTIBODIES SERPL: NORMAL
BPU ID: NORMAL
CROSSMATCH RESULT,%XM: NORMAL
SPECIMEN EXP DATE BLD: NORMAL
STATUS OF UNIT,%ST: NORMAL
UNIT DIVISION, %UDIV: 0

## 2020-04-08 ENCOUNTER — PATIENT OUTREACH (OUTPATIENT)
Dept: CASE MANAGEMENT | Age: 85
End: 2020-04-08

## 2020-04-08 NOTE — PROGRESS NOTES
Patient has had numerous attempts for [facility] call without success. CTN to resolve EDWARD episode and remove self from care team. CTN will reopen if call returned.

## 2020-05-06 DIAGNOSIS — Z95.2 S/P TAVR (TRANSCATHETER AORTIC VALVE REPLACEMENT): Primary | ICD-10-CM

## 2020-08-18 ENCOUNTER — HOSPITAL ENCOUNTER (OUTPATIENT)
Dept: LAB | Age: 85
Discharge: HOME | End: 2020-08-18
Payer: MEDICARE

## 2020-08-18 DIAGNOSIS — I50.32 CHRONIC DIASTOLIC (CONGESTIVE) HEART FAILURE (HCC): ICD-10-CM

## 2020-08-18 LAB
ANION GAP SERPL CALC-SCNC: 6 MMOL/L (ref 7–16)
BUN SERPL-MCNC: 21 MG/DL (ref 8–23)
CALCIUM SERPL-MCNC: 9.2 MG/DL (ref 8.3–10.4)
CHLORIDE SERPL-SCNC: 106 MMOL/L (ref 98–107)
CO2 SERPL-SCNC: 30 MMOL/L (ref 21–32)
CREAT SERPL-MCNC: 0.93 MG/DL (ref 0.6–1)
GLUCOSE SERPL-MCNC: 82 MG/DL (ref 65–100)
POTASSIUM SERPL-SCNC: 3.9 MMOL/L (ref 3.5–5.1)
SODIUM SERPL-SCNC: 142 MMOL/L (ref 136–145)

## 2020-08-18 PROCEDURE — 80048 BASIC METABOLIC PNL TOTAL CA: CPT

## 2020-08-18 PROCEDURE — 36415 COLL VENOUS BLD VENIPUNCTURE: CPT

## 2021-02-16 PROBLEM — Z78.9 STATIN INTOLERANCE: Status: ACTIVE | Noted: 2021-02-16

## 2021-04-26 ENCOUNTER — APPOINTMENT (RX ONLY)
Dept: URBAN - METROPOLITAN AREA CLINIC 349 | Facility: CLINIC | Age: 86
Setting detail: DERMATOLOGY
End: 2021-04-26

## 2021-04-26 DIAGNOSIS — L57.8 OTHER SKIN CHANGES DUE TO CHRONIC EXPOSURE TO NONIONIZING RADIATION: ICD-10-CM

## 2021-04-26 DIAGNOSIS — L82.0 INFLAMED SEBORRHEIC KERATOSIS: ICD-10-CM

## 2021-04-26 DIAGNOSIS — L82.1 OTHER SEBORRHEIC KERATOSIS: ICD-10-CM

## 2021-04-26 PROBLEM — C44.329 SQUAMOUS CELL CARCINOMA OF SKIN OF OTHER PARTS OF FACE: Status: ACTIVE | Noted: 2021-04-26

## 2021-04-26 PROCEDURE — A4550 SURGICAL TRAYS: HCPCS

## 2021-04-26 PROCEDURE — 99203 OFFICE O/P NEW LOW 30 MIN: CPT | Mod: 25

## 2021-04-26 PROCEDURE — ? COUNSELING

## 2021-04-26 PROCEDURE — 17282 DSTR MAL LS F/E/E/N/L/M1.1-2: CPT

## 2021-04-26 PROCEDURE — 88305 TISSUE EXAM BY PATHOLOGIST: CPT

## 2021-04-26 PROCEDURE — ? PATHOLOGY BILLING

## 2021-04-26 PROCEDURE — ? SHAVE REMOVAL AND DESTRUCTION

## 2021-04-26 PROCEDURE — 11301 SHAVE SKIN LESION 0.6-1.0 CM: CPT

## 2021-04-26 PROCEDURE — ? SHAVE REMOVAL

## 2021-04-26 ASSESSMENT — LOCATION ZONE DERM
LOCATION ZONE: LEG

## 2021-04-26 ASSESSMENT — LOCATION DETAILED DESCRIPTION DERM
LOCATION DETAILED: RIGHT LATERAL PROXIMAL CALF

## 2021-04-26 ASSESSMENT — LOCATION SIMPLE DESCRIPTION DERM
LOCATION SIMPLE: RIGHT LOWER LEG

## 2021-04-26 NOTE — PROCEDURE: PATHOLOGY BILLING
Immunohistochemistry ([id number] and [id number]) billing is not performed here. Please use the Immunohistochemistry Stain Billing plan to accomplish this.

## 2021-04-26 NOTE — PROCEDURE: SHAVE REMOVAL AND DESTRUCTION
Cautery Type: electrodesiccation
Notification Instructions: Patient will be notified of biopsy results. However, patient instructed to call the office if not contacted within 2 weeks.
Render Path Notes In Note?: No
Wound Care: Vaseline
Bill As?: Note: Bill Malignant Destruction If Path Confirms Malignant Lesion. Only Bill As Shave Removal If Path Comes Back Benign. Do Not Bill Shave Removal On Malignant Lesions.: Malignant Destruction
Consent: Written consent was obtained and risks were reviewed including but not limited to scarring, infection, bleeding, scabbing, incomplete removal, nerve damage and allergy to anesthesia.
Anesthesia Volume In Cc: 1
Detail Level: Detailed
Anesthesia Volume In Cc: 0
Bill For Surgical Tray: yes
Post-Care Instructions: I reviewed with the patient in detail post-care instructions. Patient is to keep the biopsy site dry overnight, and then apply bacitracin twice daily until healed. Patient may apply hydrogen peroxide soaks to remove any crusting.  After the procedure, the patient was observed for 5-10 minutes and was oriented to,person, place and time and denied feeling dizzy, queasy and stated that they were not going to faint
Anesthesia Type: 2% lidocaine with epinephrine
Size After Destruction (Required For Destruction Billing): 1.3
Hemostasis: Electrocautery
Accession #: dr horn read
Billing Type: Third-Party Bill
Dressing: Band-Aid

## 2021-04-26 NOTE — PROCEDURE: PATHOLOGY BILLING
Immunohistochemistry ([id number] and [id number]) billing is not performed here. Please use the Immunohistochemistry Stain Billing plan to accomplish this. Immunohistochemistry ([id number] and [id number]) billing is not performed here. Please use the Immunohistochemistry Stain Billing plan to accomplish this.

## 2021-08-26 PROBLEM — I27.22 PULMONARY HYPERTENSION DUE TO LEFT HEART DISEASE (HCC): Status: ACTIVE | Noted: 2021-08-26

## 2022-03-18 PROBLEM — J61 ASBESTOSIS (HCC): Status: ACTIVE | Noted: 2020-01-31

## 2022-03-18 PROBLEM — Z95.2 S/P TAVR (TRANSCATHETER AORTIC VALVE REPLACEMENT): Status: ACTIVE | Noted: 2020-03-24

## 2022-03-18 PROBLEM — R73.03 PREDIABETES: Status: ACTIVE | Noted: 2018-05-01

## 2022-03-18 PROBLEM — I27.22 PULMONARY HYPERTENSION DUE TO LEFT HEART DISEASE (HCC): Status: ACTIVE | Noted: 2021-08-26

## 2022-03-18 PROBLEM — E78.00 HYPERCHOLESTEROLEMIA: Status: ACTIVE | Noted: 2018-05-01

## 2022-03-19 PROBLEM — M47.819 FACET ARTHROPATHY: Status: ACTIVE | Noted: 2020-01-31

## 2022-03-19 PROBLEM — Z78.9 STATIN INTOLERANCE: Status: ACTIVE | Noted: 2021-02-16

## 2022-03-19 PROBLEM — Z85.72 HISTORY OF NON-HODGKIN'S LYMPHOMA: Status: ACTIVE | Noted: 2020-01-30

## 2022-03-19 PROBLEM — R60.0 BILATERAL LOWER EXTREMITY EDEMA: Status: ACTIVE | Noted: 2020-01-31

## 2022-03-19 PROBLEM — I10 HYPERTENSION: Status: ACTIVE | Noted: 2018-05-01

## 2022-03-19 PROBLEM — T82.857A STENOSIS OF PROSTHETIC AORTIC VALVE: Status: ACTIVE | Noted: 2020-02-28

## 2022-03-19 PROBLEM — Z87.19 S/P DILATATION OF ESOPHAGEAL STRICTURE: Status: ACTIVE | Noted: 2020-01-31

## 2022-03-19 PROBLEM — E03.9 ACQUIRED HYPOTHYROIDISM: Status: ACTIVE | Noted: 2018-05-01

## 2022-03-19 PROBLEM — Z86.73 HISTORY OF TIA (TRANSIENT ISCHEMIC ATTACK): Status: ACTIVE | Noted: 2020-01-31

## 2022-03-19 PROBLEM — I48.91 ATRIAL FIBRILLATION (HCC): Status: ACTIVE | Noted: 2020-01-31

## 2022-03-19 PROBLEM — I50.32 CHRONIC DIASTOLIC (CONGESTIVE) HEART FAILURE (HCC): Status: ACTIVE | Noted: 2020-01-31

## 2022-03-19 PROBLEM — D50.8 IRON DEFICIENCY ANEMIA SECONDARY TO INADEQUATE DIETARY IRON INTAKE: Status: ACTIVE | Noted: 2020-01-31

## 2022-03-19 PROBLEM — N39.0 UTI (URINARY TRACT INFECTION): Status: ACTIVE | Noted: 2020-03-23

## 2022-03-19 PROBLEM — Z98.890 S/P DILATATION OF ESOPHAGEAL STRICTURE: Status: ACTIVE | Noted: 2020-01-31

## 2022-03-19 PROBLEM — M43.10 ANTEROLISTHESIS: Status: ACTIVE | Noted: 2020-01-31

## 2022-03-19 PROBLEM — Z79.01 CHRONIC ANTICOAGULATION: Status: ACTIVE | Noted: 2020-01-31

## 2022-03-19 PROBLEM — M19.019 PRIMARY OSTEOARTHRITIS OF SHOULDER: Status: ACTIVE | Noted: 2020-01-24

## 2022-03-19 PROBLEM — K21.9 GASTROESOPHAGEAL REFLUX DISEASE WITHOUT ESOPHAGITIS: Status: ACTIVE | Noted: 2020-01-31

## 2022-03-20 PROBLEM — I06.0 RHEUMATIC AORTIC STENOSIS: Status: ACTIVE | Noted: 2020-02-27

## 2022-03-20 PROBLEM — K22.2 ESOPHAGEAL STENOSIS: Status: ACTIVE | Noted: 2020-01-31

## 2022-03-20 PROBLEM — I49.5 SINUS NODE DYSFUNCTION (HCC): Status: ACTIVE | Noted: 2020-02-05

## 2022-03-20 PROBLEM — Q28.3 CEREBRAL CAVERNOMA: Status: ACTIVE | Noted: 2020-01-31

## 2022-03-20 PROBLEM — Z95.3 HISTORY OF AORTIC VALVE REPLACEMENT WITH BIOPROSTHETIC VALVE: Status: ACTIVE | Noted: 2020-01-31

## 2022-03-20 PROBLEM — F51.01 PRIMARY INSOMNIA: Status: ACTIVE | Noted: 2020-01-31

## 2022-07-09 ENCOUNTER — TELEPHONE ENCOUNTER (OUTPATIENT)
Dept: URBAN - METROPOLITAN AREA CLINIC 121 | Facility: CLINIC | Age: 87
End: 2022-07-09

## 2022-07-09 RX ORDER — MULTIVITAMIN
TABLET ORAL ONCE A DAY
Refills: 0 | OUTPATIENT
Start: 2015-11-06 | End: 2015-11-11

## 2022-07-09 RX ORDER — BLOOD SUGAR DIAGNOSTIC
STRIP MISCELLANEOUS
Refills: 0 | OUTPATIENT
Start: 2015-11-11 | End: 2016-01-13

## 2022-07-09 RX ORDER — METFORMIN HCL 500 MG/1
TABLET ORAL ONCE A DAY
Refills: 0 | OUTPATIENT
Start: 2015-11-06 | End: 2015-11-11

## 2022-07-09 RX ORDER — HYDROCHLOROTHIAZIDE 12.5 MG/1
TABLET ORAL ONCE A DAY
Refills: 0 | OUTPATIENT
Start: 2016-01-13 | End: 2016-03-02

## 2022-07-09 RX ORDER — CHOLECALCIFEROL (VITAMIN D3) 25 MCG
TABLET ORAL
Refills: 0 | OUTPATIENT
Start: 2015-11-11 | End: 2016-01-13

## 2022-07-09 RX ORDER — CHOLECALCIFEROL (VITAMIN D3) 25 MCG
TABLET ORAL
Refills: 0 | OUTPATIENT
Start: 2015-11-06 | End: 2015-11-11

## 2022-07-09 RX ORDER — ATORVASTATIN CALCIUM 40 MG/1
TABLET, FILM COATED ORAL ONCE A DAY
Refills: 0 | OUTPATIENT
Start: 2015-11-06 | End: 2015-11-11

## 2022-07-09 RX ORDER — OMEPRAZOLE 40 MG/1
CAPSULE, DELAYED RELEASE ORAL ONCE A DAY
Refills: 0 | OUTPATIENT
Start: 2016-01-13 | End: 2016-03-02

## 2022-07-09 RX ORDER — QUINAPRIL HYDROCHLORIDE 40 MG/1
TABLET, FILM COATED ORAL TWICE A DAY
Refills: 0 | OUTPATIENT
Start: 2016-01-13 | End: 2016-03-02

## 2022-07-09 RX ORDER — METFORMIN HCL 500 MG/1
TABLET ORAL ONCE A DAY
Refills: 0 | OUTPATIENT
Start: 2015-11-11 | End: 2016-01-13

## 2022-07-09 RX ORDER — MULTIVITAMIN
TABLET ORAL ONCE A DAY
Refills: 0 | OUTPATIENT
Start: 2016-01-13 | End: 2016-03-02

## 2022-07-09 RX ORDER — ASPIRIN 81 MG/1
TABLET, COATED ORAL ONCE A DAY
Refills: 0 | OUTPATIENT
Start: 2015-11-11 | End: 2016-01-13

## 2022-07-09 RX ORDER — METFORMIN HCL 500 MG/1
TABLET ORAL ONCE A DAY
Refills: 0 | OUTPATIENT
Start: 2016-01-13 | End: 2016-03-02

## 2022-07-09 RX ORDER — METOPROLOL SUCCINATE 50 MG/1
TABLET, EXTENDED RELEASE ORAL ONCE A DAY
Refills: 0 | OUTPATIENT
Start: 2016-01-13 | End: 2016-03-02

## 2022-07-09 RX ORDER — CHOLECALCIFEROL (VITAMIN D3) 25 MCG
TABLET ORAL
Refills: 0 | OUTPATIENT
Start: 2016-01-13 | End: 2016-03-02

## 2022-07-09 RX ORDER — BLOOD SUGAR DIAGNOSTIC
STRIP MISCELLANEOUS
Refills: 0 | OUTPATIENT
Start: 2016-01-13 | End: 2016-03-02

## 2022-07-09 RX ORDER — ASPIRIN 81 MG/1
TABLET, COATED ORAL ONCE A DAY
Refills: 0 | OUTPATIENT
Start: 2016-01-13 | End: 2016-03-02

## 2022-07-09 RX ORDER — ESOMEPRAZOLE MAGNESIUM 40 MG
CAPSULE,DELAYED RELEASE (ENTERIC COATED) ORAL
Refills: 0 | OUTPATIENT
Start: 2016-01-13 | End: 2016-03-02

## 2022-07-09 RX ORDER — ASPIRIN 81 MG/1
TABLET, COATED ORAL ONCE A DAY
Refills: 0 | OUTPATIENT
Start: 2015-11-06 | End: 2015-11-11

## 2022-07-09 RX ORDER — LEVOTHYROXINE SODIUM 125 UG/1
TABLET ORAL ONCE A DAY
Refills: 0 | OUTPATIENT
Start: 2015-11-11 | End: 2016-01-13

## 2022-07-09 RX ORDER — OMEPRAZOLE 40 MG/1
CAPSULE, DELAYED RELEASE ORAL ONCE A DAY
Refills: 0 | OUTPATIENT
Start: 2015-11-11 | End: 2016-01-13

## 2022-07-09 RX ORDER — MULTIVITAMIN
TABLET ORAL ONCE A DAY
Refills: 0 | OUTPATIENT
Start: 2015-11-11 | End: 2016-01-13

## 2022-07-09 RX ORDER — LEVOTHYROXINE SODIUM 125 UG/1
TABLET ORAL ONCE A DAY
Refills: 0 | OUTPATIENT
Start: 2015-11-06 | End: 2015-11-11

## 2022-07-09 RX ORDER — ESOMEPRAZOLE MAGNESIUM 40 MG/1
GRANULE, DELAYED RELEASE ORAL ONCE A DAY
Refills: 0 | OUTPATIENT
Start: 2015-11-06 | End: 2015-11-11

## 2022-07-09 RX ORDER — QUINAPRIL HYDROCHLORIDE 40 MG/1
TABLET, FILM COATED ORAL TWICE A DAY
Refills: 0 | OUTPATIENT
Start: 2015-11-11 | End: 2016-01-13

## 2022-07-09 RX ORDER — HYDROCHLOROTHIAZIDE 12.5 MG/1
TABLET ORAL ONCE A DAY
Refills: 0 | OUTPATIENT
Start: 2015-11-06 | End: 2015-11-11

## 2022-07-09 RX ORDER — QUINAPRIL HYDROCHLORIDE 40 MG/1
TABLET, FILM COATED ORAL TWICE A DAY
Refills: 0 | OUTPATIENT
Start: 2015-11-06 | End: 2015-11-11

## 2022-07-09 RX ORDER — ATORVASTATIN CALCIUM 40 MG/1
TABLET, FILM COATED ORAL ONCE A DAY
Refills: 0 | OUTPATIENT
Start: 2015-11-11 | End: 2016-01-13

## 2022-07-09 RX ORDER — ESOMEPRAZOLE MAGNESIUM 40 MG
ONCE A DAY CAPSULE,DELAYED RELEASE (ENTERIC COATED) ORAL ONCE A DAY
Refills: 11 | OUTPATIENT
Start: 2015-11-11 | End: 2016-01-13

## 2022-07-09 RX ORDER — METOPROLOL SUCCINATE 50 MG/1
TABLET, EXTENDED RELEASE ORAL ONCE A DAY
Refills: 0 | OUTPATIENT
Start: 2015-11-11 | End: 2016-01-13

## 2022-07-09 RX ORDER — METOPROLOL SUCCINATE 50 MG/1
TABLET, EXTENDED RELEASE ORAL ONCE A DAY
Refills: 0 | OUTPATIENT
Start: 2015-11-06 | End: 2015-11-11

## 2022-07-09 RX ORDER — LEVOTHYROXINE SODIUM 125 UG/1
TABLET ORAL ONCE A DAY
Refills: 0 | OUTPATIENT
Start: 2016-01-13 | End: 2016-03-02

## 2022-07-09 RX ORDER — BLOOD SUGAR DIAGNOSTIC
STRIP MISCELLANEOUS
Refills: 0 | OUTPATIENT
Start: 2015-11-06 | End: 2015-11-11

## 2022-07-09 RX ORDER — ATORVASTATIN CALCIUM 40 MG/1
TABLET, FILM COATED ORAL ONCE A DAY
Refills: 0 | OUTPATIENT
Start: 2016-01-13 | End: 2016-03-02

## 2022-07-09 RX ORDER — HYDROCHLOROTHIAZIDE 12.5 MG/1
TABLET ORAL ONCE A DAY
Refills: 0 | OUTPATIENT
Start: 2015-11-11 | End: 2016-01-13

## 2022-07-10 ENCOUNTER — TELEPHONE ENCOUNTER (OUTPATIENT)
Dept: URBAN - METROPOLITAN AREA CLINIC 121 | Facility: CLINIC | Age: 87
End: 2022-07-10

## 2022-07-10 RX ORDER — METOPROLOL SUCCINATE 50 MG/1
TABLET, EXTENDED RELEASE ORAL ONCE A DAY
Refills: 0 | Status: ACTIVE | COMMUNITY
Start: 2016-03-02

## 2022-07-10 RX ORDER — METFORMIN HCL 500 MG/1
TABLET ORAL ONCE A DAY
Refills: 0 | Status: ACTIVE | COMMUNITY
Start: 2016-03-02

## 2022-07-10 RX ORDER — ATORVASTATIN CALCIUM 40 MG/1
TABLET, FILM COATED ORAL ONCE A DAY
Refills: 0 | Status: ACTIVE | COMMUNITY
Start: 2016-03-02

## 2022-07-10 RX ORDER — ESOMEPRAZOLE MAGNESIUM 40 MG
CAPSULE,DELAYED RELEASE (ENTERIC COATED) ORAL
Refills: 0 | Status: ACTIVE | COMMUNITY
Start: 2016-03-02

## 2022-07-10 RX ORDER — LEVOTHYROXINE SODIUM 125 UG/1
TABLET ORAL ONCE A DAY
Refills: 0 | Status: ACTIVE | COMMUNITY
Start: 2016-03-02

## 2022-07-10 RX ORDER — OMEPRAZOLE 40 MG/1
CAPSULE, DELAYED RELEASE ORAL ONCE A DAY
Refills: 0 | Status: ACTIVE | COMMUNITY
Start: 2016-03-02

## 2022-07-10 RX ORDER — QUINAPRIL HYDROCHLORIDE 40 MG/1
TABLET, FILM COATED ORAL TWICE A DAY
Refills: 0 | Status: ACTIVE | COMMUNITY
Start: 2016-03-02

## 2022-07-10 RX ORDER — ASPIRIN 81 MG/1
TABLET, COATED ORAL ONCE A DAY
Refills: 0 | Status: ACTIVE | COMMUNITY
Start: 2016-03-02

## 2022-07-10 RX ORDER — BLOOD SUGAR DIAGNOSTIC
STRIP MISCELLANEOUS
Refills: 0 | Status: ACTIVE | COMMUNITY
Start: 2016-03-02

## 2022-07-10 RX ORDER — MULTIVITAMIN
TABLET ORAL ONCE A DAY
Refills: 0 | Status: ACTIVE | COMMUNITY
Start: 2016-03-02

## 2022-07-10 RX ORDER — HYDROCORTISONE CREAM 1% 10 MG/G
TWICE A DAY CREAM TOPICAL TWICE A DAY
Refills: 0 | Status: ACTIVE | COMMUNITY
Start: 2016-03-02

## 2022-07-10 RX ORDER — CHOLECALCIFEROL (VITAMIN D3) 25 MCG
TABLET ORAL
Refills: 0 | Status: ACTIVE | COMMUNITY
Start: 2016-03-02

## 2022-10-07 ENCOUNTER — OFFICE VISIT (OUTPATIENT)
Dept: CARDIOLOGY CLINIC | Age: 87
End: 2022-10-07
Payer: MEDICARE

## 2022-10-07 VITALS
BODY MASS INDEX: 22.5 KG/M2 | HEART RATE: 57 BPM | HEIGHT: 64 IN | WEIGHT: 131.8 LBS | SYSTOLIC BLOOD PRESSURE: 128 MMHG | DIASTOLIC BLOOD PRESSURE: 74 MMHG

## 2022-10-07 DIAGNOSIS — Z95.2 S/P TAVR (TRANSCATHETER AORTIC VALVE REPLACEMENT): ICD-10-CM

## 2022-10-07 DIAGNOSIS — I48.21 PERMANENT ATRIAL FIBRILLATION (HCC): ICD-10-CM

## 2022-10-07 DIAGNOSIS — Z79.01 LONG TERM (CURRENT) USE OF ANTICOAGULANTS: Primary | ICD-10-CM

## 2022-10-07 PROCEDURE — 93000 ELECTROCARDIOGRAM COMPLETE: CPT | Performed by: INTERNAL MEDICINE

## 2022-10-07 PROCEDURE — 1123F ACP DISCUSS/DSCN MKR DOCD: CPT | Performed by: INTERNAL MEDICINE

## 2022-10-07 PROCEDURE — 99213 OFFICE O/P EST LOW 20 MIN: CPT | Performed by: INTERNAL MEDICINE

## 2022-10-07 ASSESSMENT — ENCOUNTER SYMPTOMS: SHORTNESS OF BREATH: 0

## 2022-10-07 NOTE — PATIENT INSTRUCTIONS
Patient Education        Atrial Fibrillation: Care Instructions  Your Care Instructions     Atrial fibrillation is an irregular and often fast heartbeat. Treating this condition is important for several reasons. It can cause blood clots, which can travel from your heart to your brain and cause a stroke. If you have a fast heartbeat, you may feel lightheaded, dizzy, and weak. An irregular heartbeat can also increase your risk for heart failure. Atrial fibrillation is often the result of another heart condition, such as high blood pressure or coronary artery disease. Making changes to improve your heart condition will help you stay healthy and active. Follow-up care is a key part of your treatment and safety. Be sure to make and go to all appointments, and call your doctor if you are having problems. It's also a good idea to know your test results and keep a list of the medicines you take. How can you care for yourself at home? Medicines    Take your medicines exactly as prescribed. Call your doctor if you think you are having a problem with your medicine. You will get more details on the specific medicines your doctor prescribes. If your doctor has given you a blood thinner to prevent a stroke, be sure you get instructions about how to take your medicine safely. Blood thinners can cause serious bleeding problems. Do not take any vitamins, over-the-counter drugs, or herbal products without talking to your doctor first.   Lifestyle changes    Do not smoke. Smoking can increase your chance of a stroke and heart attack. If you need help quitting, talk to your doctor about stop-smoking programs and medicines. These can increase your chances of quitting for good. Eat a heart-healthy diet. Stay at a healthy weight. Lose weight if you need to. Limit alcohol to 2 drinks a day for men and 1 drink a day for women. Too much alcohol can cause health problems. Avoid colds and flu.  Get a pneumococcal include:  Sudden numbness, tingling, weakness, or loss of movement in your face, arm, or leg, especially on only one side of your body. Sudden vision changes. Sudden trouble speaking. Sudden confusion or trouble understanding simple statements. Sudden problems with walking or balance. A sudden, severe headache that is different from past headaches. You passed out (lost consciousness). Call your doctor now or seek immediate medical care if:    You have new or increased shortness of breath. You feel dizzy or lightheaded, or you feel like you may faint. Your heart rate becomes irregular. You can feel your heart flutter in your chest or skip heartbeats. Tell your doctor if these symptoms are new or worse. Watch closely for changes in your health, and be sure to contact your doctor if you have any problems. Where can you learn more? Go to https://"[email or web address]. org and sign in to your [employer] account. Enter U020 in the bOombate box to learn more about \"Atrial Fibrillation: Care Instructions. \"     If you do not have an account, please click on the \"Sign Up Now\" link. Current as of: January 10, 2022               Content Version: 13.4  © 8541-9126 Healthwise, Incorporated. Care instructions adapted under license by [facility] ([facility]). If you have questions about a medical condition or this instruction, always ask your healthcare professional. [address] any warranty or liability for your use of this information.

## 2022-10-07 NOTE — PROGRESS NOTES
[facility] CARDIOLOGY  [address] [facility], [address]  PHONE: [phone]      10/07/22    NAME:  Anthony Hall  : 1927  MRN: [id number]         SUBJECTIVE:   Anthony Hall is a 80 y.o. female seen for a follow up visit regarding the following:     Chief Complaint   Patient presents with    Atrial Fibrillation    6 Month Follow-Up            HPI:  Follow up  Atrial Fibrillation and 6 Month Follow-Up   . Follow up critical AS, afib. Had valve in valve TAVR in 2020. Statin intolerant d/t myalgias. Repeat echo for clinical change in this very active nonagenarian, still traveling everywhere with her family, appears younger than stated age, denies cp, dyspnea. Past cardiac history:   ~mid-'s- chemo for lymphoma    - 21mm Toribio magna AVR-AJ Dailey.      - normal nuclear stress test   2019- Echo - normal SF, abnormal DF, normal prosthetic AVR, mean gradient recorded as 28   Normal carotid doppler   Low probability V/Q scan   TIA and admission for HFpEF -all in [place] (records on Care Everywhere)   2020 ([facility]) - LVH with preserved EF, restrictive grade 3 DD, critical AS, mean gradient 71 (5 m/s), RVSP 70+   Mar 2020- valve in valve TAVR   Echo - gradient 25 mm Hg   Statin intolerant d/t myalgia   May 2020- Echo normal EF, abn DF, normal AVR mean gradient 26, RVSP 66             Key CAD CHF Meds            apixaban (ELIQUIS) 2.5 MG TABS tablet (Taking)    Class: Historical Med    carvedilol (COREG) 3.125 MG tablet (Taking)    Class: Historical Med    torsemide (DEMADEX) 10 MG tablet (Taking)    Class: Historical Med              Past Medical History, Past Surgical History, Family history, Social History, and Medications were all reviewed with the patient today and updated as necessary. Prior to Admission medications    Medication Sig Start Date End Date Taking?  Authorizing Provider   apixaban (ELIQUIS) 2.5 MG TABS tablet Take 2.5 mg by mouth 2 times daily 6/22/20  Yes Ar Automatic Reconciliation   carvedilol (COREG) 3.125 MG tablet Take 3.125 mg by mouth 2 times daily (with meals) 12/28/20  Yes Ar Automatic Reconciliation   ferrous sulfate (IRON 325) 325 (65 Fe) MG tablet Take 325 mg by mouth every morning (before breakfast)   Yes Ar Automatic Reconciliation   levothyroxine (SYNTHROID) 137 MCG tablet Take 137 mcg by mouth every morning (before breakfast) 7/31/20  Yes Ar Automatic Reconciliation   potassium chloride (KLOR-CON M) 10 MEQ extended release tablet Take 10 mEq by mouth daily 8/26/21  Yes Ar Automatic Reconciliation   torsemide (DEMADEX) 10 MG tablet Take 10 mg by mouth daily 5/18/20  Yes Ar Automatic Reconciliation     Allergies   Allergen Reactions    Ace Inhibitors Other (See Comments)    Iodine Itching    Sulfa Antibiotics Other (See Comments)     Unable to recall    Tetracycline Hives     Past Medical History:   Diagnosis Date    A-fib ([facility]     Arthritis     Chronic venous insufficiency     Dyslipidemia     Former smoker, stopped smoking in distant past     Quit 1980-- 0.25 ppd x 30+ years    H/O asbestosis     pleural calcifications    H/O diphtheria     in childhood/[place]    H/O echocardiogram     Echo 2/14/20 LVEF 55-65%, 2004 bioprosthetic valve with severe stenosis, severe tricuspid regurg, pulmonary htn, L pleural effusion    H/O gastroesophageal reflux (GERD)     resolved per patient since     Heart failure ([facility] [address]     ECHO LVEF 55-65%  2/14/20    Hypercholesterolemia     Hypertension     Non Hodgkin's lymphoma ([facility] [address] 2012    chemo x 16 months    Pleural effusion on left     2/14/20 ECHO / [facility]    Poor historian     Pulmonary hypertension ([facility]     Echo 2/14/20 @ [facility]    Thyroid disease     TIA (transient ischemic attack)      Past Surgical History:   Procedure Laterality Date    AORTIC VALVE REPLACEMENT  2004    Bio    CATARACT REMOVAL Right     iol    CATARACT REMOVAL Left     iol    CHOLECYSTECTOMY 1990    THYROIDECTOMY, PARTIAL  1950s    2/3      Family History   Problem Relation Age of Onset    No Known Problems Maternal Grandmother     No Known Problems Maternal Grandfather     No Known Problems Paternal Grandmother     No Known Problems Paternal Grandfather     Ovarian Cancer Sister     No Known Problems Brother     Congenital Heart Defect Brother     No Known Problems Son     No Known Problems Son     No Known Problems Daughter     Other Daughter         IBS    No Known Problems Daughter     Cancer Mother 72    Heart Attack Father 80    Heart Disease Father [de-identified]    Delayed Awakening Daughter         only with sodium pentathol     Social History     Tobacco Use    Smoking status: Former     Packs/day: 0.25     Types: Cigarettes     Quit date: 1980     Years since quittin.7    Smokeless tobacco: Never   Substance Use Topics    Alcohol use: Yes     Alcohol/week: 7.0 standard drinks       ROS:    Review of Systems   Cardiovascular:  Negative for chest pain. Respiratory:  Negative for shortness of breath. PHYSICAL EXAM:   /74   Pulse 57   Ht 5' 4\" (1.626 m)   Wt 131 lb 12.8 oz (59.8 kg)   BMI 22.62 kg/m²      Wt Readings from Last 3 Encounters:   10/07/22 131 lb 12.8 oz (59.8 kg)   22 136 lb (61.7 kg)   21 140 lb (63.5 kg)     BP Readings from Last 3 Encounters:   10/07/22 128/74   22 (!) 150/60   21 (!) 170/74     Pulse Readings from Last 3 Encounters:   10/07/22 57   22 60   21 58           Physical Exam  Constitutional:       Appearance: Normal appearance. HENT:      Head: Normocephalic and atraumatic. Eyes:      Conjunctiva/sclera: Conjunctivae normal.   Neck:      Vascular: No carotid bruit. Cardiovascular:      Rate and Rhythm: Bradycardia present. Rhythm irregular. Heart sounds: Murmur heard. Harsh midsystolic murmur is present with a grade of 2/6 at the upper right sternal border and upper left sternal border.      No friction rub. No gallop. Pulmonary:      Effort: No respiratory distress. Breath sounds: No wheezing or rales. Musculoskeletal:      Cervical back: Neck supple. Skin:     General: Skin is warm and dry. Neurological:      General: No focal deficit present. Mental Status: She is alert. Psychiatric:         Mood and Affect: Mood normal.         Behavior: Behavior normal.       Medical problems and test results were reviewed with the patient today.      DATA REVIEW    BMP  Lab Results   Component Value Date/Time     08/18/2020 11:32 AM    K 3.9 08/18/2020 11:32 AM     08/18/2020 11:32 AM    CO2 30 08/18/2020 11:32 AM    BUN 21 08/18/2020 11:32 AM    CREATININE 0.93 08/18/2020 11:32 AM    GLUCOSE 82 08/18/2020 11:32 AM    CALCIUM 9.2 08/18/2020 11:32 AM      4/28/22 1529     Sodium 136 - 145 mmol/L 139    Potassium 3.5 - 5.1 mmol/L 3.9    Chloride 98 - 107 mmol/L 103    CO2 20 - 30 mmol/L 30    Anion Gap 6 - 16 mmol/L 6    BUN 7 - 20 mg/dL 13    Creatinine 0.57 - 1.11 mg/dL 0.87    Glucose 70 - 99 mg/dL 121 High     Calcium 8.5 - 10.4 mg/dL 9.5    AST 5 - 34 IU/L 22    ALT <55 IU/L 13    Alkaline Phosphatase 40 - 150 IU/L 66    Protein, Total 6.2 - 8.3 g/dL 6.8    Albumin 3.4 - 4.8 g/dL 3.8    Bilirubin, Total 0.1 - 1.2 mg/dL 2.0 High     eGFR >59 mL/min/1.73 m2 61       4/28/22 1529    WBC 3.5 - 10.8 K/uL 7.5    RBC 3.86 - 5.35 M/uL 3.71 Low     Hemoglobin 11 - 15.4 g/dL 12.5    Hematocrit 35.6 - 47.3 % 38.3    MCV 79 - 100 fL 103.2 High     MCH 23.7 - 32.9 pg 33.7 High     MCHC 30 - 36.5 g/dL 32.6    RDW-CV 11.6 - 16 % 14.2    Platelets 655 - 436 K/uL 218        EKG    atrial fib with slow ventricular response 57  RBBB  PVCs  diffuse T wave abnormality  left axis deviation d/t lafb  bifascicular block    CXR/IMAGING        DEVICE INTERROGATION        OUTSIDE RECORDS REVIEW    Records from outside providers have been reviewed and summarized as noted in the HPI, past history and data review sections of this note, and reviewed with patient. .       ASSESSMENT and PLAN    Elena was seen today for atrial fibrillation and 6 month follow-up. Diagnoses and all orders for this visit:    Long term (current) use of anticoagulants  -     EKG 12 Lead    Permanent atrial fibrillation (HCC)  -     EKG 12 Lead    S/P TAVR (transcatheter aortic valve replacement)        IMPRESSION:       Rate controlled, anticoagulated, continue meds      Stable valve disease, asymptomatic. Repeat echo for clinical change    BP controlled. Continue meds. Drop carvedilol if BP begins to drop or she becomes symptomatic      Return in about 1 year (around 10/7/2023). Thank you for allowing me to participate in this patient's care. Please call or contact me if there are any questions or concerns regarding the above.       Abran Bhandari MD  10/07/22  11:24 AM

## 2022-11-09 ENCOUNTER — TELEPHONE (OUTPATIENT)
Dept: CARDIOLOGY CLINIC | Age: 87
End: 2022-11-09

## 2022-11-09 NOTE — TELEPHONE ENCOUNTER
BP here was 120/70's at her last visit. She has aortic valve disease so have to be careful not to over treat. Additionally, the ER note documents a heart rate of 67 clinically but reports a heart rate of 36 on the EKG which I cannot view. She had a  non contrasted head CT that was negative. 150-160 is reasonable for now, particularly since meds adjusted only a couple of days ago. I recommend having her wear a 24 hour heart monitor to assess the heart rate, continue current meds for now, very  low Na diet. Try to get HA under control with heat, massage, stretching and tylenol. Begin checking BP 3 days a week, always at least 2 hours after meds and bring with diary to OV after monitor. ER for worsening symptoms  per DR Joey Velasquez notified of DR. Patel's response appointments scheduled for 24 hour Holter monitor in  11-10-22 and follow up in  on 11-21-22 [id number]//melanieab

## 2022-11-09 NOTE — TELEPHONE ENCOUNTER
Son called stating that patient was taken to [facility] on November 3-4 and November 5-6 for high blood pressure. Blood pressure was running 158-168 with headache. Today b/p 168/68 pulse 64-68 patient is taking Norvasc 7.5 mg in at bedtime and Coreg 3.125 mg in the morning. Saw PCP on Monday and was told to call and let Dr Manuel Gaston know about patient's blood pressure. Son informed that Dr Manuel Gaston would be given message call back with doctors response.  Son voiced understanding//maryam    Last office visit 10-7-22

## 2022-11-09 NOTE — TELEPHONE ENCOUNTER
Pt's son called on behalf of the pt. States the pt was in the hospital twice last week with elevated BP. Pt saw their PCP Monday and states that it was recommended that they call our office and make sure Dr. Chelsey Argueta is made aware. Today, BP is 168/68. Pt's son states that he tries to check the pt's BP 2-3 times a day.

## 2022-11-10 ENCOUNTER — NURSE ONLY (OUTPATIENT)
Dept: CARDIOLOGY CLINIC | Age: 87
End: 2022-11-10

## 2022-11-10 ENCOUNTER — TELEPHONE (OUTPATIENT)
Dept: CARDIOLOGY CLINIC | Age: 87
End: 2022-11-10

## 2022-11-10 DIAGNOSIS — I49.9 IRREGULAR HEART RATE: Primary | ICD-10-CM

## 2022-11-20 NOTE — PROGRESS NOTES
[address] [address] Irina Christie, [address] [employer] [facility], [address] [place] [address]  PHONE: [phone]      22    NAME:  Herson Henao  : 1927  MRN: [id number]         SUBJECTIVE:   Herson Henao is a 80 y.o. female seen for a follow up visit regarding the following:     Chief Complaint   Patient presents with    Hyperlipidemia    Hypertension            HPI:  Follow up  Hyperlipidemia and Hypertension   . Follow up critical AS, afib. Had valve in valve TAVR in 2020. Statin intolerant d/t myalgias. See triage notes, had been to ER a couple of times with headaches, BP as high as 170-180, meds adjusted by PCP, called back with BP generally running 150-160, we had her hold the meds where they were having just been changed, and had her wear a 24 hr monitor, the ER having documented a heart rate of 36. This looked well as appears below and she was asked to bring her BP monitor to this follow up after appropriate period on higher dose meds and with conservative management of headache. Forgot to bring the monitor but brought the diary. There's been gradual improvement, having been as high as 190's, now pretty consistently 215'I systolic with some as low as today's 130/50. She feels much better, her HA's have resolved completely She does have chronic non cardiogenic edema which may have worsened with higher dose amlodipine but not troublesome to her apart from the appearance, wears compression socks.              Past cardiac history:   ~mid-'s- chemo for lymphoma    - 21mm Toribio magna AVR-AJ Dailey.      - normal nuclear stress test   2019- Echo - normal SF, abnormal DF, normal prosthetic AVR, mean gradient recorded as 28   Normal carotid doppler   Low probability V/Q scan   TIA and admission for HFpEF -all in [place] (records on [facility])   2020 ([facility]) - LVH with preserved EF, restrictive grade 3 DD, critical AS, mean gradient 71 (5 m/s), RVSP 70+   Mar 2020- valve in valve TAVR   Echo - gradient 25 mm Hg   Statin intolerant d/t myalgia   May 2020- Echo normal EF, abn DF, normal AVR mean gradient 26, RVSP 66  Nov 2022       Ambulatory monitor - Persistent atrial fibrillation with slow ventricular response, avg 58. (Peak 102, < 10 beats) Underlying IVCD No pauses or block and no diary entries            Key CAD CHF Meds            amLODIPine (NORVASC) 5 MG tablet (Taking)    Class: Historical Med    apixaban (ELIQUIS) 2.5 MG TABS tablet (Taking)    Class: Historical Med    carvedilol (COREG) 3.125 MG tablet (Taking)    Class: Historical Med    torsemide (DEMADEX) 10 MG tablet (Taking)    Class: Historical Med              Past Medical History, Past Surgical History, Family history, Social History, and Medications were all reviewed with the patient today and updated as necessary. Prior to Admission medications    Medication Sig Start Date End Date Taking?  Authorizing Provider   B Complex Vitamins (B COMPLEX 1 PO) Take 1 tablet by mouth daily   Yes Historical Provider, MD   GARLIC PO Take by mouth daily   Yes Historical Provider, MD   Multiple Vitamin (MULTIVITAMIN ADULT PO) Take by mouth daily   Yes Historical Provider, MD   amLODIPine (NORVASC) 5 MG tablet Take by mouth daily 12/30/19  Yes Historical Provider, MD   apixaban (ELIQUIS) 2.5 MG TABS tablet Take 2.5 mg by mouth 2 times daily 6/22/20  Yes Ar Automatic Reconciliation   carvedilol (COREG) 3.125 MG tablet Take 3.125 mg by mouth 2 times daily (with meals) 12/28/20  Yes Ar Automatic Reconciliation   ferrous sulfate (IRON 325) 325 (65 Fe) MG tablet Take 325 mg by mouth every morning (before breakfast)   Yes Ar Automatic Reconciliation   levothyroxine (SYNTHROID) 137 MCG tablet Take 137 mcg by mouth every morning (before breakfast) 7/31/20  Yes Ar Automatic Reconciliation   potassium chloride (KLOR-CON M) 10 MEQ extended release tablet Take 10 mEq by mouth daily 8/26/21  Yes Ar Automatic Reconciliation   torsemide (DEMADEX) 10 MG tablet Take 10 mg by mouth daily 5/18/20  Yes Ar Automatic Reconciliation     Allergies   Allergen Reactions    Ace Inhibitors Other (See Comments)    Iodine Itching    Sulfa Antibiotics Other (See Comments)     Unable to recall    Tetracycline Hives     Past Medical History:   Diagnosis Date    A-fib ([facility] [address]     Arthritis     Chronic venous insufficiency     Dyslipidemia     Former smoker, stopped smoking in distant past     [address]-- 0.25 ppd x 30+ years    H/O asbestosis     pleural calcifications    H/O diphtheria     in childhood/[place]    H/O echocardiogram     Echo 2/14/20 LVEF 55-65%, 2004 bioprosthetic valve with severe stenosis, severe tricuspid regurg, pulmonary htn, L pleural effusion    H/O gastroesophageal reflux (GERD)     resolved per patient since     Heart failure ([facility] [address]     ECHO LVEF 55-65%  2/14/20    Hypercholesterolemia     Hypertension     Non Hodgkin's lymphoma ([facility] [address] 2012    chemo x 16 months    Pleural effusion on left     2/14/20 ECHO / [facility]    Poor historian     Pulmonary hypertension ([facility] [address]     Echo 2/14/20 @ [facility]    Thyroid disease     TIA (transient ischemic attack)      Past Surgical History:   Procedure Laterality Date    AORTIC VALVE REPLACEMENT  2004    Bio    CATARACT REMOVAL Right     iol    CATARACT REMOVAL Left     iol    CHOLECYSTECTOMY  1990    THYROIDECTOMY, PARTIAL  1950s    2/3      Family History   Problem Relation Age of Onset    No Known Problems Maternal Grandmother     No Known Problems Maternal Grandfather     No Known Problems Paternal Grandmother     No Known Problems Paternal Grandfather     Ovarian Cancer Sister     No Known Problems Brother     Congenital Heart Defect Brother     No Known Problems Son     No Known Problems Son     No Known Problems Daughter     Other Daughter         IBS    No Known Problems Daughter     Cancer Mother 72    Heart Attack Father 80    Heart Disease Father [de-identified]    Delayed Awakening Daughter         only with sodium pentathol     Social History     Tobacco Use    Smoking status: Former     Packs/day: 0.25     Types: Cigarettes     Quit date: 1980     Years since quittin.9    Smokeless tobacco: Never   Substance Use Topics    Alcohol use: Yes     Alcohol/week: 7.0 standard drinks       ROS:    Review of Systems   Cardiovascular:  Positive for leg swelling. Respiratory:  Negative for shortness of breath. Endocrine: Negative for cold intolerance. PHYSICAL EXAM:   BP (!) 130/52   Pulse 56 Comment: Irregular  Ht 5' 4\" (1.626 m)   Wt 131 lb (59.4 kg)   BMI 22.49 kg/m²      Wt Readings from Last 3 Encounters:   22 131 lb (59.4 kg)   10/07/22 131 lb 12.8 oz (59.8 kg)   22 136 lb (61.7 kg)     BP Readings from Last 3 Encounters:   22 (!) 130/52   10/07/22 128/74   22 (!) 150/60     Pulse Readings from Last 3 Encounters:   22 56   10/07/22 57   22 60           Physical Exam  Constitutional:       Appearance: Normal appearance. HENT:      Head: Normocephalic and atraumatic. Eyes:      Conjunctiva/sclera: Conjunctivae normal.   Neck:      Vascular: No carotid bruit. Cardiovascular:      Rate and Rhythm: Normal rate and regular rhythm. Heart sounds: Murmur heard. Harsh midsystolic murmur is present with a grade of 3/6 at the upper right sternal border radiating to the neck. No friction rub. No gallop. Pulmonary:      Effort: No respiratory distress. Breath sounds: No wheezing or rales. Musculoskeletal:         General: No swelling. Cervical back: Neck supple. Skin:     General: Skin is warm and dry. Neurological:      General: No focal deficit present. Mental Status: She is alert. Psychiatric:         Mood and Affect: Mood normal.         Behavior: Behavior normal.       Medical problems and test results were reviewed with the patient today.      DATA REVIEW    Lab Results   Component Value Date    WBC 6.7 05/07/2020    HGB 12.2 05/07/2020    HCT 36.4 05/07/2020     (H) 05/07/2020     05/07/2020     Hemoglobin A1C   Date Value Ref Range Status   03/23/2020 5.8 4.8 - 6.0 % Final     BMP  Lab Results   Component Value Date/Time     08/18/2020 11:32 AM    K 3.9 08/18/2020 11:32 AM     08/18/2020 11:32 AM    CO2 30 08/18/2020 11:32 AM    BUN 21 08/18/2020 11:32 AM    CREATININE 0.93 08/18/2020 11:32 AM    GLUCOSE 82 08/18/2020 11:32 AM    CALCIUM 9.2 08/18/2020 11:32 AM          EKG        CXR/IMAGING        DEVICE INTERROGATION        OUTSIDE RECORDS REVIEW    Records from outside providers have been reviewed and summarized as noted in the HPI, past history and data review sections of this note, and reviewed with patient. .       ASSESSMENT and [facility] was seen today for hyperlipidemia and hypertension. Diagnoses and all orders for this visit:    Chronic diastolic (congestive) heart failure (HCC)    Essential hypertension    Permanent atrial fibrillation (HCC)    S/P TAVR (transcatheter aortic valve replacement)    Chronic anticoagulation    History of aortic valve replacement with bioprosthetic valve        IMPRESSION:    BP control improved and acceptable given its range and her advanced age, continue meds     Rate controlled, anticoagulated, continue meds      Stable TAVR, echo for clinical change        No follow-ups on file. Thank you for allowing me to participate in this patient's care. Please call or contact me if there are any questions or concerns regarding the above.       Rogelio Gallardo MD  11/21/22  1:02 PM

## 2022-11-21 ENCOUNTER — OFFICE VISIT (OUTPATIENT)
Dept: CARDIOLOGY CLINIC | Age: 87
End: 2022-11-21
Payer: MEDICARE

## 2022-11-21 VITALS
HEART RATE: 56 BPM | SYSTOLIC BLOOD PRESSURE: 130 MMHG | HEIGHT: 64 IN | BODY MASS INDEX: 22.36 KG/M2 | DIASTOLIC BLOOD PRESSURE: 52 MMHG | WEIGHT: 131 LBS

## 2022-11-21 DIAGNOSIS — I50.32 CHRONIC DIASTOLIC (CONGESTIVE) HEART FAILURE (HCC): Primary | ICD-10-CM

## 2022-11-21 DIAGNOSIS — I48.21 PERMANENT ATRIAL FIBRILLATION (HCC): ICD-10-CM

## 2022-11-21 DIAGNOSIS — Z79.01 CHRONIC ANTICOAGULATION: ICD-10-CM

## 2022-11-21 DIAGNOSIS — Z95.2 S/P TAVR (TRANSCATHETER AORTIC VALVE REPLACEMENT): ICD-10-CM

## 2022-11-21 DIAGNOSIS — I10 ESSENTIAL HYPERTENSION: ICD-10-CM

## 2022-11-21 DIAGNOSIS — Z95.3 HISTORY OF AORTIC VALVE REPLACEMENT WITH BIOPROSTHETIC VALVE: ICD-10-CM

## 2022-11-21 PROCEDURE — 1123F ACP DISCUSS/DSCN MKR DOCD: CPT | Performed by: INTERNAL MEDICINE

## 2022-11-21 PROCEDURE — 1090F PRES/ABSN URINE INCON ASSESS: CPT | Performed by: INTERNAL MEDICINE

## 2022-11-21 PROCEDURE — G8484 FLU IMMUNIZE NO ADMIN: HCPCS | Performed by: INTERNAL MEDICINE

## 2022-11-21 PROCEDURE — G8427 DOCREV CUR MEDS BY ELIG CLIN: HCPCS | Performed by: INTERNAL MEDICINE

## 2022-11-21 PROCEDURE — 1036F TOBACCO NON-USER: CPT | Performed by: INTERNAL MEDICINE

## 2022-11-21 PROCEDURE — G8420 CALC BMI NORM PARAMETERS: HCPCS | Performed by: INTERNAL MEDICINE

## 2022-11-21 PROCEDURE — 99213 OFFICE O/P EST LOW 20 MIN: CPT | Performed by: INTERNAL MEDICINE

## 2022-11-21 RX ORDER — AMLODIPINE BESYLATE 5 MG/1
TABLET ORAL DAILY
COMMUNITY
Start: 2019-12-30

## 2022-11-21 ASSESSMENT — ENCOUNTER SYMPTOMS: SHORTNESS OF BREATH: 0

## 2023-01-17 ENCOUNTER — TELEPHONE (OUTPATIENT)
Dept: CARDIOLOGY CLINIC | Age: 88
End: 2023-01-17

## 2023-01-17 RX ORDER — AMLODIPINE BESYLATE 5 MG/1
7.5 TABLET ORAL DAILY
Qty: 1 TABLET | Refills: 0
Start: 2023-01-17

## 2023-01-17 NOTE — TELEPHONE ENCOUNTER
Pt's son called on behalf of the pt. States her legs, especially her left leg, have been swollen since yesterday. States she was in a lot of pain yesterday but today describes it as a \"discomfort. \" However, pt's son states that his mother typically downplays her pain level. Some SOB with exertion. Denies CP. Pt takes Norvasc.

## 2023-01-17 NOTE — TELEPHONE ENCOUNTER
Danisha Santos MD  You 36 minutes ago (12:34 PM)     BM   It could be from amlodipine but not usually at this dose, but we can hold it for now to see if it improves. As long as her BP generally stays < 150/90 that will be ok. If she's missed any eliquis please get a venous doppler.

## 2023-01-17 NOTE — TELEPHONE ENCOUNTER
Pt's son notified of Dr. Radha Sue response. He said he stays up on her medications & knows she has not missed any doses of Eliquis. He also states she has been taking Amlodipine 5mg 1 1/2 tablets daily. He will have pt HOLD Amlodipine for now to see if swelling improves. He will continue to monitor HR & BP. If swelling doesn't improve off Amlodipine, he will let us know. He will also notify us if BP consistently goes above 150/90.

## 2023-01-17 NOTE — TELEPHONE ENCOUNTER
Spoke to pt's son. Pt c/o BLE edema. He said yesterday, she had an unusual amount of swelling in her legs. Left leg and ankle much worse than right. He states edema in left leg is swollen up above the knee. He states she appears to have a \"fluid filled\" pocket right above the knee. He states today, swelling is better in the right leg, but not the left. He states she walked around yesterday a little more than she normally does. No CP but c/o SOB only with exertion. No  c/o weight gain. Pt is using Compression stockings & elevating her legs when at rest. He states water intake is very minimal 12oz or< a day. He states pt refuses to increase water intake. BP normal 120's/80's. HR 60    Meds: Amlodipine 5mg daily             Eliquis 2.5mg BID             Carvedilol 3.15mg BID             Torsemide 10mg daily    Pt's son concerned that swelling may be coming from Amlodipine.

## 2023-02-20 ENCOUNTER — TELEPHONE (OUTPATIENT)
Dept: CARDIOLOGY CLINIC | Age: 88
End: 2023-02-20

## 2023-02-20 RX ORDER — TORSEMIDE 10 MG/1
10 TABLET ORAL 2 TIMES DAILY
Qty: 60 TABLET | Refills: 5 | Status: SHIPPED | OUTPATIENT
Start: 2023-02-20

## 2023-02-20 RX ORDER — POTASSIUM CHLORIDE 750 MG/1
10 TABLET, EXTENDED RELEASE ORAL 2 TIMES DAILY
Qty: 60 TABLET | Refills: 5 | Status: SHIPPED | OUTPATIENT
Start: 2023-02-20

## 2023-02-20 NOTE — TELEPHONE ENCOUNTER
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 At 89 with volume overload might they want to consider hospice? We can increase her torsemide to BID dosing and add potassium but recommend she meet with her providers about the possibiltiy of hospice care for possible recurrent lymphoma. If she goes on higher dose diuretic will need BMP in 1 week. Spoke to daughter. Requests this RN call pt's son. Attempted to call pt's son, no answer. Left message to call. Shady Cameron at PCP office made aware of Dr. Nader Bonner recommendations.

## 2023-02-20 NOTE — TELEPHONE ENCOUNTER
Lionelkentrell Daniele called from [address] [facility] Reporting that the pt has been having leg swellings, and had a CT of the abdomin and wanted Dr. Aide Silver to take a look of the Ct. The family is reporting worst swelling in the legs and abdomin over the weekend.

## 2023-05-19 ENCOUNTER — TELEPHONE (OUTPATIENT)
Age: 88
End: 2023-05-19

## 2023-05-19 NOTE — TELEPHONE ENCOUNTER
Daughter wanted to make sure Dr. Temitope Zimmerman is aware patient is now in Hospice care. She Canceled moms appointment and did not reachedule.

## (undated) DEVICE — GOWN,REINF,POLY,ECL,PP SLV,XL: Brand: MEDLINE

## (undated) DEVICE — GUIDEWIRE VASC L260CM DIA0.035IN TAPR L11CM FLPY TIP L4CM

## (undated) DEVICE — SUTURE PERMAHAND SZ 0 L30IN NONABSORBABLE BLK L30MM PSL 3/8 590H

## (undated) DEVICE — (D)PREP SKN CHLRAPRP APPL 26ML -- CONVERT TO ITEM 371833

## (undated) DEVICE — COVER,TABLE,HEAVY DUTY,79"X110",STRL: Brand: MEDLINE

## (undated) DEVICE — ELECTRODE DEFIB CARD W/ LVP GEL MULTFUNC PRO-PADZ

## (undated) DEVICE — DRAPE TWL SURG 16X26IN BLU ORB04] ALLCARE INC]

## (undated) DEVICE — 12 FOOT DISPOSABLE EXTENSION CABLE WITH SAFE CONNECT / SCREW-DOWN

## (undated) DEVICE — SOLUTION IV 1000ML 0.9% SOD CHL